# Patient Record
Sex: MALE | Race: WHITE | Employment: OTHER | URBAN - METROPOLITAN AREA
[De-identification: names, ages, dates, MRNs, and addresses within clinical notes are randomized per-mention and may not be internally consistent; named-entity substitution may affect disease eponyms.]

---

## 2022-07-26 ENCOUNTER — HOSPITAL ENCOUNTER (INPATIENT)
Age: 77
LOS: 7 days | Discharge: HOME | DRG: 100 | End: 2022-08-02
Attending: INTERNAL MEDICINE | Admitting: INTERNAL MEDICINE
Payer: MEDICARE

## 2022-07-26 PROBLEM — R56.9 SEIZURE (HCC): Status: ACTIVE | Noted: 2022-07-26

## 2022-07-26 LAB
BACTERIA: ABNORMAL /HPF
BILIRUBIN URINE: NEGATIVE
BLOOD, URINE: ABNORMAL
CLARITY: CLEAR
COLOR: YELLOW
GLUCOSE URINE: NEGATIVE MG/DL
KETONES, URINE: >=80 MG/DL
LEUKOCYTE ESTERASE, URINE: NEGATIVE
NITRITE, URINE: NEGATIVE
PH UA: 6 (ref 5–9)
PROLACTIN: 12.03 NG/ML
PROTEIN UA: 100 MG/DL
RBC UA: ABNORMAL /HPF (ref 0–2)
SPECIFIC GRAVITY UA: >=1.03 (ref 1–1.03)
UROBILINOGEN, URINE: 2 E.U./DL
WBC UA: ABNORMAL /HPF (ref 0–5)

## 2022-07-26 PROCEDURE — 2580000003 HC RX 258: Performed by: FAMILY MEDICINE

## 2022-07-26 PROCEDURE — 2060000000 HC ICU INTERMEDIATE R&B

## 2022-07-26 PROCEDURE — 84146 ASSAY OF PROLACTIN: CPT

## 2022-07-26 PROCEDURE — 6360000002 HC RX W HCPCS: Performed by: FAMILY MEDICINE

## 2022-07-26 PROCEDURE — 36415 COLL VENOUS BLD VENIPUNCTURE: CPT

## 2022-07-26 PROCEDURE — 6370000000 HC RX 637 (ALT 250 FOR IP): Performed by: FAMILY MEDICINE

## 2022-07-26 PROCEDURE — 81001 URINALYSIS AUTO W/SCOPE: CPT

## 2022-07-26 RX ORDER — DIAZEPAM 5 MG/ML
2 INJECTION, SOLUTION INTRAMUSCULAR; INTRAVENOUS EVERY 5 MIN PRN
Status: DISCONTINUED | OUTPATIENT
Start: 2022-07-26 | End: 2022-08-02 | Stop reason: HOSPADM

## 2022-07-26 RX ORDER — ONDANSETRON 2 MG/ML
4 INJECTION INTRAMUSCULAR; INTRAVENOUS EVERY 6 HOURS PRN
Status: DISCONTINUED | OUTPATIENT
Start: 2022-07-26 | End: 2022-08-02 | Stop reason: HOSPADM

## 2022-07-26 RX ORDER — LORAZEPAM 0.5 MG/1
0.5 TABLET ORAL 4 TIMES DAILY
COMMUNITY

## 2022-07-26 RX ORDER — QUETIAPINE FUMARATE 100 MG/1
50 TABLET, FILM COATED ORAL 2 TIMES DAILY
Status: ON HOLD | COMMUNITY
End: 2022-08-02 | Stop reason: HOSPADM

## 2022-07-26 RX ORDER — SODIUM CHLORIDE 9 MG/ML
INJECTION, SOLUTION INTRAVENOUS CONTINUOUS
Status: DISCONTINUED | OUTPATIENT
Start: 2022-07-26 | End: 2022-07-29

## 2022-07-26 RX ORDER — SODIUM CHLORIDE 9 MG/ML
INJECTION, SOLUTION INTRAVENOUS PRN
Status: DISCONTINUED | OUTPATIENT
Start: 2022-07-26 | End: 2022-08-02 | Stop reason: HOSPADM

## 2022-07-26 RX ORDER — LEVETIRACETAM 5 MG/ML
500 INJECTION INTRAVASCULAR EVERY 12 HOURS
Status: DISCONTINUED | OUTPATIENT
Start: 2022-07-26 | End: 2022-07-27

## 2022-07-26 RX ORDER — ENOXAPARIN SODIUM 100 MG/ML
40 INJECTION SUBCUTANEOUS DAILY
Status: DISCONTINUED | OUTPATIENT
Start: 2022-07-26 | End: 2022-08-02 | Stop reason: HOSPADM

## 2022-07-26 RX ORDER — POLYETHYLENE GLYCOL 3350 17 G/17G
17 POWDER, FOR SOLUTION ORAL DAILY PRN
Status: DISCONTINUED | OUTPATIENT
Start: 2022-07-26 | End: 2022-08-02 | Stop reason: HOSPADM

## 2022-07-26 RX ORDER — ACETAMINOPHEN 650 MG/1
650 SUPPOSITORY RECTAL EVERY 6 HOURS PRN
Status: DISCONTINUED | OUTPATIENT
Start: 2022-07-26 | End: 2022-08-02 | Stop reason: HOSPADM

## 2022-07-26 RX ORDER — LORAZEPAM 2 MG/ML
1 INJECTION INTRAMUSCULAR EVERY 5 MIN PRN
Status: DISCONTINUED | OUTPATIENT
Start: 2022-07-26 | End: 2022-07-26 | Stop reason: CLARIF

## 2022-07-26 RX ORDER — SODIUM CHLORIDE 0.9 % (FLUSH) 0.9 %
5-40 SYRINGE (ML) INJECTION PRN
Status: DISCONTINUED | OUTPATIENT
Start: 2022-07-26 | End: 2022-08-02 | Stop reason: HOSPADM

## 2022-07-26 RX ORDER — ONDANSETRON 4 MG/1
4 TABLET, ORALLY DISINTEGRATING ORAL EVERY 8 HOURS PRN
Status: DISCONTINUED | OUTPATIENT
Start: 2022-07-26 | End: 2022-08-02 | Stop reason: HOSPADM

## 2022-07-26 RX ORDER — ZOLPIDEM TARTRATE 10 MG/1
10 TABLET ORAL NIGHTLY
COMMUNITY

## 2022-07-26 RX ORDER — SODIUM CHLORIDE 0.9 % (FLUSH) 0.9 %
5-40 SYRINGE (ML) INJECTION EVERY 12 HOURS SCHEDULED
Status: DISCONTINUED | OUTPATIENT
Start: 2022-07-26 | End: 2022-08-02 | Stop reason: HOSPADM

## 2022-07-26 RX ORDER — ACETAMINOPHEN 325 MG/1
650 TABLET ORAL EVERY 6 HOURS PRN
Status: DISCONTINUED | OUTPATIENT
Start: 2022-07-26 | End: 2022-08-02 | Stop reason: HOSPADM

## 2022-07-26 RX ADMIN — SODIUM CHLORIDE: 9 INJECTION, SOLUTION INTRAVENOUS at 20:39

## 2022-07-26 RX ADMIN — ACETAMINOPHEN 650 MG: 325 TABLET ORAL at 20:38

## 2022-07-26 RX ADMIN — ONDANSETRON 4 MG: 4 TABLET, ORALLY DISINTEGRATING ORAL at 20:38

## 2022-07-26 RX ADMIN — LEVETIRACETAM 500 MG: 5 INJECTION INTRAVENOUS at 20:00

## 2022-07-26 RX ADMIN — ENOXAPARIN SODIUM 40 MG: 100 INJECTION SUBCUTANEOUS at 20:38

## 2022-07-26 ASSESSMENT — PAIN - FUNCTIONAL ASSESSMENT: PAIN_FUNCTIONAL_ASSESSMENT: 0-10

## 2022-07-26 NOTE — H&P
Hospitalist History & Physical      PCP: No primary care provider on file. Date of Service: Pt seen/examined on 7/26/2022     Chief Complaint:  had no chief complaint listed for this encounter. History Of Present Illness:    Mr. Gene Rodriguez, a 65y.o. year old male  who  has no past medical history on file. Transferred from [facility] where he presented with altered mental status. Patient has a history of end-stage dementia. Patient's family reports that he has had several episodes of what they are calling seizure-like activity. These episodes involve trembling of the arms, rolling back of the eyes, incontinence of urine. After these episodes he appears to go back to his baseline, which is altered. Family reports the main thing is after his episode the unable to get him to go to sleep even after taking Ambien. Family states he has a history of seizures but is not currently on antiepileptics. Patient was transferred to [facility] for further neurological evaluation. Vital signs reveal the patient to be mildly tachycardic with a rate of 101 and hypertensive with pressures 176/89. He is afebrile. Laboratory studies from the sending facility reveal a WBC of 15.5, glucose 131, ALT 39. Urinalysis unremarkable. Troponin 25 with repeat of 17. Chest x-ray shows cardiomegaly. CT head was unremarkable. CTA head unremarkable. CT abdomen pelvis unremarkable. Family wishes the patient be a full code      No past medical history on file. No past surgical history on file. Prior to Admission medications    Not on File         Allergies:  Patient has no allergy information on record. Social History:    TOBACCO:   has no history on file for tobacco use. ETOH:   has no history on file for alcohol use. Family History:    Reviewed in detail and negative for DM, CAD, Cancer, CVA. Positive as follows\"  No family history on file.     REVIEW OF SYSTEMS: Pertinent positives as noted in the HPI. All other systems reviewed and negative. PHYSICAL EXAM:  There were no vitals taken for this visit. General appearance: No acute distress, mildly restless/fidgety. Alert to self  HEENT: Normal cephalic, atraumatic without obvious deformity. Neck: Supple, with full range of motion. No jugular venous distention. Trachea midline. Respiratory: Clear to auscultation bilaterally  Cardiovascular: Regular rate and rhythm  Abdomen: Soft, nontender, nondistended  Musculoskeletal: No clubbing, cyanosis, edema of bilateral lower extremities. Brisk capillary refill. Skin: Normal skin color. No rashes or lesions. Neurologic: Confused. Restless, no focal deficits      CBC:   No results for input(s): WBC, RBC, HGB, HCT, MCV, RDW, PLT in the last 72 hours. BMP: No results for input(s): NA, K, CL, CO2, BUN, CREATININE, CA, MG, PHOS in the last 72 hours. LFT:  No results for input(s): PROT, ALB, ALKPHOS, ALT, AST, BILITOT, AMYLASE, LIPASE in the last 72 hours. CE:  No results for input(s): Geno Comber in the last 72 hours. PT/INR: No results for input(s): INR, APTT in the last 72 hours. BNP: No results for input(s): BNP in the last 72 hours. ESR: No results found for: SEDRATE  CRP: No results found for: CRP  D Dimer: No results found for: DDIMER   Folate and B12: No results found for: OGQEAQRR65, No results found for: FOLATE  Lactic Acid: No results found for: LACTA  Thyroid Studies: No results found for: TSH, U1MWAHM, K2UHOVP, THYROIDAB    Oupatient labs:  No results found for: CHOL, TRIG, HDL, LDLCALC, TSH, PSA, INR, LABA1C    Urinalysis:  No results found for: NITRU, WBCUA, BACTERIA, RBCUA, BLOODU, SPECGRAV, GLUCOSEU    Imaging:  No results found.     ASSESSMENT:  -Altered mental status  -Seizure-like activity  -Dementia  -Leukocytosis      PLAN:  -Admit to medicine  -Consult neurology  -Seizure precautions  -Keppra 500 mg twice daily  -Normal saline 75 mg/h  -EEG  -Continue home medications        Diet: ADULT DIET; Regular  Code Status: Full Code  Surrogate decision maker confirmed with patient:   Extended Emergency Contact Information  Primary Emergency Contact: german lizama  Mobile Phone: [phone]  Relation: Spouse    DVT Prophylaxis: []Lovenox []Heparin []PCD [] [address] []Encouraged ambulation  Disposition: []Med/Surg [] Intermediate [] ICU/CCU  Admit status: [] Observation [] Inpatient     +++++++++++++++++++++++++++++++++++++++++++++++++  Suhail Wan,   +++++++++++++++++++++++++++++++++++++++++++++++++  NOTE: This report was transcribed using voice recognition software. Every effort was made to ensure accuracy; however, inadvertent computerized transcription errors may be present.

## 2022-07-27 ENCOUNTER — APPOINTMENT (OUTPATIENT)
Dept: NEUROLOGY | Age: 77
DRG: 100 | End: 2022-07-27
Attending: INTERNAL MEDICINE
Payer: MEDICARE

## 2022-07-27 PROBLEM — F03.90 DEMENTIA (HCC): Status: ACTIVE | Noted: 2022-07-27

## 2022-07-27 PROBLEM — E87.6 HYPOKALEMIA: Status: ACTIVE | Noted: 2022-07-27

## 2022-07-27 PROBLEM — R79.89 ABNORMAL LFTS: Status: ACTIVE | Noted: 2022-07-27

## 2022-07-27 PROBLEM — R41.82 AMS (ALTERED MENTAL STATUS): Status: ACTIVE | Noted: 2022-07-27

## 2022-07-27 LAB
ALBUMIN SERPL-MCNC: 3.4 G/DL (ref 3.5–5.2)
ALP BLD-CCNC: 83 U/L (ref 40–129)
ALT SERPL-CCNC: 59 U/L (ref 0–40)
ANION GAP SERPL CALCULATED.3IONS-SCNC: 12 MMOL/L (ref 7–16)
AST SERPL-CCNC: 47 U/L (ref 0–39)
BASOPHILS ABSOLUTE: 0.04 E9/L (ref 0–0.2)
BASOPHILS RELATIVE PERCENT: 0.4 % (ref 0–2)
BILIRUB SERPL-MCNC: 1 MG/DL (ref 0–1.2)
BUN BLDV-MCNC: 8 MG/DL (ref 6–23)
CALCIUM SERPL-MCNC: 8.5 MG/DL (ref 8.6–10.2)
CHLORIDE BLD-SCNC: 106 MMOL/L (ref 98–107)
CO2: 24 MMOL/L (ref 22–29)
CREAT SERPL-MCNC: 0.7 MG/DL (ref 0.7–1.2)
EOSINOPHILS ABSOLUTE: 0 E9/L (ref 0.05–0.5)
EOSINOPHILS RELATIVE PERCENT: 0 % (ref 0–6)
GFR AFRICAN AMERICAN: >60
GFR NON-AFRICAN AMERICAN: >60 ML/MIN/1.73
GLUCOSE BLD-MCNC: 97 MG/DL (ref 74–99)
HCT VFR BLD CALC: 40.1 % (ref 37–54)
HEMOGLOBIN: 13.8 G/DL (ref 12.5–16.5)
IMMATURE GRANULOCYTES #: 0.02 E9/L
IMMATURE GRANULOCYTES %: 0.2 % (ref 0–5)
LACTIC ACID: 1.1 MMOL/L (ref 0.5–2.2)
LYMPHOCYTES ABSOLUTE: 1.48 E9/L (ref 1.5–4)
LYMPHOCYTES RELATIVE PERCENT: 13.7 % (ref 20–42)
MAGNESIUM: 2 MG/DL (ref 1.6–2.6)
MCH RBC QN AUTO: 30.9 PG (ref 26–35)
MCHC RBC AUTO-ENTMCNC: 34.4 % (ref 32–34.5)
MCV RBC AUTO: 89.9 FL (ref 80–99.9)
MONOCYTES ABSOLUTE: 0.77 E9/L (ref 0.1–0.95)
MONOCYTES RELATIVE PERCENT: 7.1 % (ref 2–12)
NEUTROPHILS ABSOLUTE: 8.48 E9/L (ref 1.8–7.3)
NEUTROPHILS RELATIVE PERCENT: 78.6 % (ref 43–80)
PDW BLD-RTO: 12.4 FL (ref 11.5–15)
PLATELET # BLD: 275 E9/L (ref 130–450)
PMV BLD AUTO: 9.9 FL (ref 7–12)
POTASSIUM REFLEX MAGNESIUM: 2.8 MMOL/L (ref 3.5–5)
RBC # BLD: 4.46 E12/L (ref 3.8–5.8)
REASON FOR REJECTION: NORMAL
REJECTED TEST: NORMAL
SODIUM BLD-SCNC: 142 MMOL/L (ref 132–146)
TOTAL PROTEIN: 6.6 G/DL (ref 6.4–8.3)
WBC # BLD: 10.8 E9/L (ref 4.5–11.5)

## 2022-07-27 PROCEDURE — 36415 COLL VENOUS BLD VENIPUNCTURE: CPT

## 2022-07-27 PROCEDURE — 6360000002 HC RX W HCPCS: Performed by: FAMILY MEDICINE

## 2022-07-27 PROCEDURE — 83605 ASSAY OF LACTIC ACID: CPT

## 2022-07-27 PROCEDURE — 80053 COMPREHEN METABOLIC PANEL: CPT

## 2022-07-27 PROCEDURE — 95816 EEG AWAKE AND DROWSY: CPT | Performed by: PSYCHIATRY & NEUROLOGY

## 2022-07-27 PROCEDURE — 97535 SELF CARE MNGMENT TRAINING: CPT

## 2022-07-27 PROCEDURE — 6370000000 HC RX 637 (ALT 250 FOR IP): Performed by: INTERNAL MEDICINE

## 2022-07-27 PROCEDURE — 2060000000 HC ICU INTERMEDIATE R&B

## 2022-07-27 PROCEDURE — 97165 OT EVAL LOW COMPLEX 30 MIN: CPT

## 2022-07-27 PROCEDURE — 6360000002 HC RX W HCPCS

## 2022-07-27 PROCEDURE — 6360000002 HC RX W HCPCS: Performed by: INTERNAL MEDICINE

## 2022-07-27 PROCEDURE — 6370000000 HC RX 637 (ALT 250 FOR IP): Performed by: PSYCHIATRY & NEUROLOGY

## 2022-07-27 PROCEDURE — 95819 EEG AWAKE AND ASLEEP: CPT

## 2022-07-27 PROCEDURE — 83735 ASSAY OF MAGNESIUM: CPT

## 2022-07-27 PROCEDURE — 85025 COMPLETE CBC W/AUTO DIFF WBC: CPT

## 2022-07-27 RX ORDER — DONEPEZIL HYDROCHLORIDE 5 MG/1
5 TABLET, FILM COATED ORAL NIGHTLY
Status: DISCONTINUED | OUTPATIENT
Start: 2022-07-27 | End: 2022-08-02 | Stop reason: HOSPADM

## 2022-07-27 RX ORDER — HALOPERIDOL 5 MG/ML
2 INJECTION INTRAMUSCULAR ONCE
Status: COMPLETED | OUTPATIENT
Start: 2022-07-27 | End: 2022-07-27

## 2022-07-27 RX ORDER — POTASSIUM CHLORIDE 7.45 MG/ML
INJECTION INTRAVENOUS
Status: COMPLETED
Start: 2022-07-27 | End: 2022-07-27

## 2022-07-27 RX ORDER — QUETIAPINE FUMARATE 25 MG/1
50 TABLET, FILM COATED ORAL NIGHTLY
Status: DISCONTINUED | OUTPATIENT
Start: 2022-07-27 | End: 2022-08-02 | Stop reason: HOSPADM

## 2022-07-27 RX ORDER — AMLODIPINE BESYLATE 5 MG/1
5 TABLET ORAL DAILY
Status: DISCONTINUED | OUTPATIENT
Start: 2022-07-27 | End: 2022-08-02 | Stop reason: HOSPADM

## 2022-07-27 RX ORDER — POTASSIUM CHLORIDE 7.45 MG/ML
10 INJECTION INTRAVENOUS
Status: COMPLETED | OUTPATIENT
Start: 2022-07-27 | End: 2022-07-27

## 2022-07-27 RX ORDER — POTASSIUM CHLORIDE 29.8 MG/ML
20 INJECTION INTRAVENOUS ONCE
Status: DISCONTINUED | OUTPATIENT
Start: 2022-07-27 | End: 2022-07-27 | Stop reason: SDUPTHER

## 2022-07-27 RX ORDER — QUETIAPINE FUMARATE 25 MG/1
25 TABLET, FILM COATED ORAL DAILY
Status: DISCONTINUED | OUTPATIENT
Start: 2022-07-28 | End: 2022-08-02 | Stop reason: HOSPADM

## 2022-07-27 RX ORDER — LEVETIRACETAM 500 MG/1
500 TABLET ORAL 2 TIMES DAILY
Status: DISCONTINUED | OUTPATIENT
Start: 2022-07-27 | End: 2022-08-02 | Stop reason: HOSPADM

## 2022-07-27 RX ADMIN — POTASSIUM CHLORIDE 10 MEQ: 7.46 INJECTION, SOLUTION INTRAVENOUS at 09:53

## 2022-07-27 RX ADMIN — QUETIAPINE FUMARATE 50 MG: 25 TABLET ORAL at 20:00

## 2022-07-27 RX ADMIN — HALOPERIDOL LACTATE 2 MG: 5 INJECTION, SOLUTION INTRAMUSCULAR at 21:52

## 2022-07-27 RX ADMIN — LEVETIRACETAM 500 MG: 5 INJECTION INTRAVENOUS at 09:36

## 2022-07-27 RX ADMIN — DONEPEZIL HYDROCHLORIDE 5 MG: 5 TABLET, FILM COATED ORAL at 20:00

## 2022-07-27 RX ADMIN — AMLODIPINE BESYLATE 5 MG: 5 TABLET ORAL at 09:40

## 2022-07-27 RX ADMIN — POTASSIUM BICARBONATE 20 MEQ: 782 TABLET, EFFERVESCENT ORAL at 20:00

## 2022-07-27 RX ADMIN — POTASSIUM CHLORIDE 10 MEQ: 7.46 INJECTION, SOLUTION INTRAVENOUS at 11:14

## 2022-07-27 RX ADMIN — LEVETIRACETAM 500 MG: 500 TABLET, FILM COATED ORAL at 20:00

## 2022-07-27 RX ADMIN — POTASSIUM BICARBONATE 20 MEQ: 782 TABLET, EFFERVESCENT ORAL at 09:40

## 2022-07-27 RX ADMIN — ENOXAPARIN SODIUM 40 MG: 100 INJECTION SUBCUTANEOUS at 07:49

## 2022-07-27 NOTE — CARE COORDINATION
Transfer from [address] [place] [address]. Patient has a history of end-stage dementia. Patient's family reports that he has had several episodes of what they are calling seizure-like activity. These episodes involve trembling of the arms, rolling back of the eyes, incontinence of urine. Neurology consult. Jailyn New. Call placed to daughter Hank Daniel. He lives with her and his wife Susana Butler. His PCP is Dr Suzie Dukes and uses [facility]. He has been independent PTA. No [facility] or [facility] history. . Wife and daughter in room - gave snf list for PA. Await pt/ot evals to assist with discharge plan. Cm/sw to follow. Electronically signed by Doris Keenan RN on 7/27/2022 at 2:03 PM

## 2022-07-27 NOTE — PROGRESS NOTES
Hospitalist Progress Note      Synopsis: Patient admitted on 7/26/2022         [facility]. Beth Noel, a 65y.o. year old male  who  has no past medical history on file. Transferred from [facility] where he presented with altered mental status. Patient has a history of end-stage dementia. Patient's family reports that he has had several episodes of what they are calling seizure-like activity. These episodes involve trembling of the arms, rolling back of the eyes, incontinence of urine. After these episodes he appears to go back to his baseline, which is altered. Family reports the main thing is after his episode the unable to get him to go to sleep even after taking Ambien. Family states he has a history of seizures but is not currently on antiepileptics. Patient was transferred to [facility] for further neurological evaluation. Vital signs reveal the patient to be mildly tachycardic with a rate of 101 and hypertensive with pressures 176/89. He is afebrile. Laboratory studies from the sending facility reveal a WBC of 15.5, glucose 131, ALT 39. Urinalysis unremarkable. Troponin 25 with repeat of 17. Chest x-ray shows cardiomegaly. CT head was unremarkable. CTA head unremarkable. CT abdomen pelvis unremarkable. ically improving. Feeling better. Stable overnight. No other overnight issues reported. Subjective   Patient seen in the EEG room. He appeared to continue to be mildly confused. All of his imaging is from [facility]    Exam:  BP (!) 168/88   Pulse 91   Temp 97.7 °F (36.5 °C) (Temporal)   Resp 17   Ht 5' 8\" (1.727 m)   Wt 162 lb (73.5 kg)   SpO2 96%   BMI 24.63 kg/m²   General appearance: No apparent distress, appears stated age and cooperative. HEENT: Pupils equal, round, and reactive to light. Conjunctivae/corneas clear. Neck:. Trachea midline.   Respiratory: Decreased cardiovascular: Regular rate and rhythm with normal S1/S2 without murmurs, rubs or gallops. Abdomen: Soft, non-tender, non-distended with normal bowel sounds. Musculoskeletal: No clubbing, cyanosis or edema bilaterally. Able to move his extremities  Is confused   skin:  No rashes    Neurologic: awake, alert and following commands but appears weak    Medications:  Reviewed    Infusion Medications    sodium chloride      sodium chloride 75 mL/hr at 07/26/22 2039     Scheduled Medications    haloperidol lactate  2 mg IntraVENous Once    potassium chloride  20 mEq IntraVENous Once    potassium bicarbonate  25 mEq Oral BID    amLODIPine  5 mg Oral Daily    sodium chloride flush  5-40 mL IntraVENous 2 times per day    enoxaparin  40 mg SubCUTAneous Daily    levETIRAcetam  500 mg IntraVENous Q12H     PRN Meds: sodium chloride flush, sodium chloride, ondansetron **OR** ondansetron, polyethylene glycol, acetaminophen **OR** acetaminophen, diazePAM    I/O    Intake/Output Summary (Last 24 hours) at 7/27/2022 0858  Last data filed at 7/27/2022 0741  Gross per 24 hour   Intake --   Output 950 ml   Net -950 ml       Labs:   Recent Labs     07/27/22  0643   WBC 10.8   HGB 13.8   HCT 40.1          Recent Labs     07/27/22  0530      K 2.8*      CO2 24   BUN 8   CREATININE 0.7   CALCIUM 8.5*       Recent Labs     07/27/22  0530   PROT 6.6   ALKPHOS 83   ALT 59*   AST 47*   BILITOT 1.0       No results for input(s): INR in the last 72 hours. No results for input(s): Jessie Gazella in the last 72 hours. Chronic labs:  No results found for: CHOL, TRIG, HDL, LDLCALC, TSH, PSA, INR, LABA1C    Microbiology:  Pending  No results for input(s): BC in the last 72 hours. No results for input(s): ORG in the last 72 hours. No results for input(s): Marinus Risk in the last 72 hours. No results for input(s): STREPNEUMAGU in the last 72 hours. No results for input(s): LP1UAG in the last 72 hours. No results for input(s): ASO in the last 72 hours.   No results for input(s): CULTRESP in the last 72 hours. No results for input(s): PROCAL in the last 72 hours. Radiology:  Imaging studies reviewed today. These are from [facility]    ASSESSMENT:    Principal Problem:    Seizure ([facility] [address]  Active Problems:    AMS (altered mental status)    Dementia ([address]    Hypokalemia    Abnormal LFTs  Resolved Problems:    * No resolved hospital problems. *       PLAN:      1. EEG in progress  2. I did review his notes from his outpatient office visit he was on lisinopril. At this point we can resume with amlodipine for now. 3.  Also he has had a Wilkins's catheter. We need to probably check for a UA given his age and lack of self-care and mobility  4. Maintain Keppra in place  5. Await neurology  He would be a great candidate for a [facility] status with comfort care status with comfort meds. We can request palliative services to see    Diet: ADULT DIET; Regular  Code Status: Full Code  PT/OT Eval Status:   Order  DVT Prophylaxis:   In place  Recommended disposition at discharge: Skilled facility    +++++++++++++++++++++++++++++++++++++++++++++++++  Sandip Mixon MD   [facility].  +++++++++++++++++++++++++++++++++++++++++++++++++  NOTE: This report was transcribed using voice recognition software. Every effort was made to ensure accuracy; however, inadvertent computerized transcription errors may be present.

## 2022-07-27 NOTE — PROCEDURES
[address] Report    MRN: [id number]   PATIENT NAME: Tatiana Jordan   DATE OF REPORT: 2022    DATE OF SERVICE: 2022    PHYSICIAN NAME: Reed Liriano DO  Referring Physician-Dr Lucina Almeida      Patient's : 1945   Patient's Age: 65 y.o. Gender: male     PROCEDURE: Routine EEG with video      Clinical Interpretation: This abnormal study showed evidence of:    Mild nonspecific cerebral dysfunction of the right frontal region  A mild nonspecific encephalopathy    Structural abnormalities should be considered for the findings above and appropriate imaging obtained if clinically indicated. No seizures or epileptiform discharges were noted during this study. ____________________________  Electronically signed by: Reed Liriano DO, 2022 9:09 AM      Patient Clinical Information   Reason for Study: Patient undergoing evaluation for seizure  Patient State: Awake  Primary neurological diagnosis: Seizure   Primary indication for monitoring: Characterization of seizure disorder    Pertinent Medications and Treatments    haloperidol     levetiracetam    diazepam    Sedatives administered: No  Intubated: No  Pharmacological paralytic: No    Reporting Period  Start of Study: 69, 2022   End of Study:  75, 2022       EEG Description  Digital video and scalp EEG monitoring was performed using the standard protocol for this laboratory. Scalp electrodes were applied in the international 10/20 system. Multiple digital montage arrangements were utilized for evaluation. EKG and video were recorded.      Background:      Occipital rhythm (posterior dominant rhythm or PDR): Present   Frequency: 8 Hz  Voltage: Medium   Organization: fair   Reactivity to eye opening/closure: fair    Drowsiness: Present - normal  Sleep: Absent    Technical and Activation Procedures:  Hyperventilation: Not done        Photic stimulation: Done - physiologic driving noted Reactivity to stimulation: Yes    Abnormalities:    I. Seizures? No    II. Rhythmic or Periodic Patterns? No    III. Other Abnormalities?         Rare irregular delta activity noted at Fp2, F4, F8 No

## 2022-07-27 NOTE — CONSULTS
Palliative Care Department  [phone]  Palliative Care Initial Consult  Provider Rody Sarabia, APRN - CNP      PATIENT: Dominga Groves  : 1945  MRN: [id number]  ADMISSION DATE: 2022  6:37 PM  Referring Provider: Ольга Echevarria MD    Palliative Medicine was consulted on hospital day 1 for assistance with Goals of care, Code Status Discussion, Family support. HPI:     Rubi Gomes is a 65 y.o. y/o male with no medical history on file, history of of end-stage dementia, seizures not on antiepileptic medication, who presented to [facility]) on 2022 as a transfer from [facility]  with altered mental status, after family complaining of patient having seizure-like activity at home. At the emergency room significant laboratory finding were WBC 15.5, ALT 39, troponin 25, and was repeated of 17. Chest x-ray shows cardiomegaly. This morning patient is having EEG at the bedside. Neurology was consulted. Palliative medicine consulted to discuss goal of care, CODE STATUS discussion, family support    ASSESSMENT/PLAN:     Pertinent Hospital Diagnoses     Seizures      Palliative Care Encounter / Counseling Regarding Goals of Care  Please see detailed goals of care discussion as below  At this time, Dominga Groves, Does Not have capacity for medical decision-making. Capacity is time limited and situation/question specific  During encounter Heather  was surrogate medical decision-maker  Outcome of goals of care meeting:  Continue with current medical treatment  Wife is going to bring in copies of POA documents  Wife would want her  to remain a full code, and have all efforts provided to maintain his life. Code status Full Code  I reviewed with the  wife Easton Braun  that given multiple medical co-morbidities, advanced disease process, and advanced age, in the event of cardiac arrest, the chances of surviving may likely be low.  It was also reviewed that if they survived a cardiac arrest, a return to prior level of function also may be low. Advanced Directives: no POA or living will in epic  Surrogate/Legal NOK:  Taylor Alvarez (Spouse) [phone]  Narda Barry (Child) [phone]    Spiritual assessment: no spiritual distress identified  Bereavement and grief: to be determined  Referrals to: none today    Thank you for the opportunity to participate in the care of Parish Singleton. RUDY Edmondson CNP  Palliative Medicine     SUBJECTIVE:     Details of Conversation:     Chart was reviewed. Saw patient at the bedside. Sitter present in the room. Patient was alert, awake, however he was pleasantly confused. Per sitter, patient was able to eat all his breakfast independently:  Spoke with patient's wife, Jayla Tilley over the phone. Introduced myself and the role of palliative medicine. Heather identified herself also as patient's POA, she is going to bring copies of POA documents today. We discussed goal of care, she stated her  was diagnosed with Alzheimer's dementia 12 years ago, and recently and they were told he was at the end stages of dementia, however Heather reports he has been self-sufficient with minimal assist at home, still having good appetite, and continent of bowels and urine onto recently, when he started experiencing seizure-like episodes over the weekend and he had episodes of incontinence. Patient was seen at [facility] after these episode over the weekend, he was then transfer to us for further management. Heather reported of noticing tremor-like symptoms back in February, he was prescribed Ativan for these episodes, he also takes Ambien at night for sleep. In the past he has used Namenda and Aricept, she believed these 2 medications were discontinue 6 years ago. And the only medication he takes at home, besides the Ativan and Ambien, is Seroquel twice daily.   Adriel Asencio stated her  would have wanted to continue to receive treatment, and once patient is stable, plan is for him to go back home to her in [facility]. We discussed CODE STATUS. Betzaida King stated her  has no spoken to her about CODE STATUS, and wanted to leave it up to her to make those decisions when times come. We did have a discussions about DNR CCA, and the probability of the patient is to arrest, he would not go back to his previous quality of life. Paola Lopez is presently standing, and is going to consider DNR CCA, however she wishes for her  to remain a full code. Comfort support was provided. Goal of care and CODE STATUS has been established. No further PM needs has been identified. Going to sign off for now. Please reconsult us if new PM needs arises. The Functional Assessment Staging Tool (FAST) can be utilized to help determine the functional assessment of a patient with Dementia. This information is helpful in determining the patients prognosis and support needed for ADL performance. Check the highest consecutive level of disability. # Level of Disability   []  1 No difficulty   [x]  2 Subjective work difficulties. [x]  3 Decreased organization capacity.    [x]  4 Decreased ability to perform complex tasks   [x]  5 Requires assistance in choosing proper clothing   [x]  6a Improperly putting on clothes without assistance or cuing   [x]  6b Difficulty adjusting bath-water temperature   [x]  6c Inability to handle the mechanics of toileting   [x]  6d Urinary Incontinence   []  6e Fecal Incontinence   []  7a Able to speak 6 or fewer intelligible words in a 24 hr period or during an interview   []  7b Able to only speak one intelligible word in a 24 hr period or during an interview   []  7c Cannot walk without assistance   []  7d Cannot sit up without assistance   []  7e Loss of ability to smile   []  7f Loss of ability to hold head up independently          Prognosis: Guarded    OBJECTIVE:     BP (!) 168/88   Pulse 91   Temp 97.7 °F (36.5 °C) (Temporal) Resp 17   Ht 5' 8\" (1.727 m)   Wt 162 lb (73.5 kg)   SpO2 96%   BMI 24.63 kg/m²     Physical Examination:  Gen: elderly, thin, NAD, awake, alert, confused  HEENT: normocephalic, atraumatic, PERRL, EOMI,   Neck: trachea midline, no JVD  Lungs: respirations easy and not labored,   Heart: regular rate and rhythm, distant heart tones,   Abdomen: normoactive bowel sounds, soft, non-tender  Extremities:edema, moving all extremities    Skin: warm, dry without rashes, lesions, bruising  Neuro: awake, confused, follows commands, no gross neurologic deficit    Objective data reviewed: labs, images, records, medication use, vitals, and chart    Time/Communication  Greater than 50% of time spent, total 50 minutes in counseling and coordination of care at the bedside regarding  CODE STATUS discussion, family support and goals of care. Thank you for allowing Palliative Medicine to participate in the care of Alex Dougherty. Note: This report was completed using computerWatchwith voiced recognition software. Every effort has been made to ensure accuracy; however, inadvertent computerized transcription errors may be present.

## 2022-07-27 NOTE — PROGRESS NOTES
[address]        Date:2022                                                  Patient Name: Gabriela Medel    MRN: [id number]    : 1945    Room: [address] [place] [address]          Evaluating OT: Elliot Casas OTR/L; [id number]       Referring Provider: Terry Vann DO    Specific Provider Orders/Date: OT Eval and Treat 22      Diagnosis: Seizure; AMS (altered mental status); Dementia; Hypokalemia; Abnormal LFTs    Surgery: None this admission     Pertinent Medical History:  has no past medical history on file.     Recommended Adaptive Equipment:      Precautions:  Fall Risk, cognition, +alarms, bedside sitter, goodman, seizure precautions     Assessment of current deficits    [x] Functional mobility  [x]ADLs  [x] Strength               []Cognition    [x] Functional transfers   [x] IADLs         [x] Safety Awareness   [x]Endurance    [] Fine Coordination              [x] Balance      [] Vision/perception   []Sensation     []Gross Motor Coordination  [] ROM  [] Delirium                   [] Motor Control     OT PLAN OF CARE   OT POC based on physician orders, patient diagnosis and results of clinical assessment    Frequency/Duration 1-3 days/wk for 2 weeks PRN   Specific OT Treatment Interventions to include:   * Instruction/training on adapted ADL techniques and AE recommendations to increase functional independence within precautions       * Training on energy conservation strategies, correct breathing pattern and techniques to improve independence/tolerance for self-care routine  * Functional transfer/mobility training/DME recommendations for increased independence, safety, and fall prevention  * Patient/Family education to increase follow through with safety techniques and functional independence  * Recommendation of environmental modifications for increased safety with functional transfers/mobility and ADLs  * Therapeutic exercise to improve motor endurance, ROM, and functional strength for ADLs/functional transfers  * Therapeutic activities to facilitate/challenge dynamic balance, stand tolerance for increased safety and independence with ADLs  * Positioning to improve skin integrity, interaction with environment and functional independence    Pt questionable historian. Home Living: Pt stating lives with mother however per chart review pt lives with wife & daughter. Bathroom setup: ?   Equipment owned: ?  Prior Level of Function: ? with ADLs , ? with IADLs; engaged in functional mobility with use of  ? Driving: ?  Occupation: ?    Pain Level: Pt with no c/o pain throughout session  Cognition: A&O: 1/4; Follows 1-2 step directions with min cuing for redirection to task - pt appears pleasantly confused at times. Memory:  Poor   Sequencing:  Poor   Problem solving:  Poor   Judgement/safety:  Poor     Functional Assessment:  AM-PAC Daily Activity Raw Score: 15/24   Initial Eval Status  Date: 7/27/22 Treatment Status  Date: STGs = LTGs  Time frame: 10-14 days   Feeding Setup  Independent    Grooming Minimal Assist   Modified [place]    UB Dressing Minimal Assist   Modified [place]    LB Dressing Moderate Assist   Modified [place]    Bathing Moderate Assist  Modified [place]    Toileting Maximal Assist   Modified [place]    Bed Mobility  Supine to sit: Minimal Assist   Sit to supine: Minimal Assist   Supine to sit: Independent   Sit to supine: Independent    Functional Transfers Sit to stand:Minimal Assist   Stand to sit:Minimal Assist  Stand pivot: NT  Commode: NT  Sit to stand: Modified [place]   Stand to sit:Modified [place]   Stand pivot: Modified [place]   Commode: Modified [place]     Functional Mobility Minimal Assist  Use of ww ~5 side steps towards HOB. Modified [place] with use of Appropriate AD.    Balance Sitting: Static - SBA     Dynamic - Minimal Assist  Standing: Minimal Assist  Sitting:     Static: Independent     Dynamic: Independent  Standing: Modified [place]    Activity Tolerance Fair  Good   Visual/  Perceptual Glasses: Yes  Appears WFL      Safety Poor  Good  during ADL completion     Hand Dominance Right   AROM (PROM) Strength Additional Info:  Goal:   RUE  WFL 4-/5 grossly tested good  and wfl FMC/dexterity noted during ADL tasks   Improve overall RUE strength FL for participation in functional tasks       LUE WFL 4-/5 grossly tested Good  and wfl FMC/dexterity noted during ADL tasks   Improve overall LUE strength WFL for participation in functional tasks       Hearing: [facility]  Sensation:  No c/o numbness or tingling BUE  Tone: WFL BUE  Edema: Unremarkable    Comment: Cleared by RN to see pt. Upon arrival patient supine in bed and agreeable to OT session. At end of session, patient supine in bed with call light and phone within reach, +bed alarm, bedside sitter, all lines and tubes intact. Overall patient demonstrated decreased independence and safety during completion of ADL/functional transfer/mobility tasks. Therapist facilitated ADL tasks, functional transfers, functional mobility, bed mobility to address safety awareness, implementation of fall prevention strategies, & engagement throughout functional tasks. Pt pleasantly confused throughout session requiring min cuing for redirection to task & command following. Pt would benefit from continued skilled OT to increase safety and independence with completion of ADL/IADL tasks for functional independence and quality of life. Treatment: OT treatment provided this date includes: ADL-  Instruction/training on safety and adapted techniques for completion of ADLs: Pt sat EOB to don/doff socks utilizing figure-4 technique. Pt required assist to don gown seated EOB.  Pt required min cuing throughout ADLs for sequencing & redirection to task as pt appears pleasantly confused at times. Mobility-  Instruction/training on safety and improved independence with bed mobility/functional transfers and functional mobility. Pt utilized ww to maintain dynamic standing balance throughout session. Pt required assist/cuing for proper hand placement & ww management/safety. Sitting EOB ~10 minutes to improve dynamic sitting balance and activity tolerance during ADLs. Skilled positioning/alignment-  Proper Positioning/Alignment. Pt required assist/cuing to maintain proper body mechanics throughout session to promote skin/joint integrity, safety awareness, & implementation of fall prevention strategies throughout daily activities. Rehab Potential: Good for established goals     LTG: maximize independence with ADLs to return to PLOF    Patient and/or family were instructed on functional diagnosis, prognosis/goals and OT plan of care. Demonstrated fair understanding. Eval Complexity: Low  History: Expanded chart review of medical records and additional review of physical, cognitive, or psychosocial history related to current functional performance  Exam: 3+ performance deficits  Assistance/Modification: Min/mod assistance or modifications required to perform tasks. May have comorbidities that affect occupational performance. Evaluation time includes thorough review of current medical information, gathering information on past medical & social history & PLOF, completion of standardized testing, informal observation of tasks, consultation with other medical professions/disciplines, assessment of data & development of POC/goals.      Time In: 12:34p  Time Out: 12:57p  Total Treatment Time: 8 minutes    Min Units   OT Eval Low 93946  x     OT Eval Medium 31737      OT Eval High 97947      OT Re-Eval [id number]       Therapeutic Ex 02433       Therapeutic Activities 42602       ADL/Self Care 26145  8 1    Orthotic Management 93083       Manual 40694     Neuro Re-Ed 64547 Non-Billable Time          Evaluation Time additionally includes thorough review of current medical information, gathering information on past medical history/social history and prior level of function, interpretation of standardized testing/informal observation of tasks, assessment of data and development of plan of care and goals.             Courtney Hernandez OTR/L; [id number]

## 2022-07-27 NOTE — CONSULTS
NEUROLOGY CONSULT NOTE       Requesting Physician: Damaris Diaz MD     Reason for Consult:  Evaluate for seizures      IMPRESSION:  Possible seizures, though history I get from his primary care doctor's notes indicates that these episodes are quite frequent, more frequent I would suspect with a seizure disorder. RECOMMENDATIONS:  I think it is reasonable to treat these as seizures and see what happens. He has been started on Keppra 500 mg twice a day and we can continue that dosage; switching to oral.  He seems to be tolerating it well. He has a sitter and if we see any seizure-like episodes that could be increased to 750 mg twice a day. If the episodes continue I would really question if these are truly epileptic seizures. Hopefully the EEG which was done this morning will be helpful. Dr. Meredith Richard will be reviewing that study. It looks as if he was on Aricept and Seroquel at home. I am going to resume those but I am ordering the Seroquel at a slightly lower dosage as it can lower seizure threshold a little. History of Present Illness:  Adrian Arias is a 65 y.o. male admitted to [address] [place] [address] on 7/26/2022. He had been lethargic in the morning of July 24 and was not talking much. He had an apparent seizure that afternoon. Went to [facility] where no neurology was available, but they had to wait until yesterday until a bed was available here for transfer. Looking back through his primary care records he has had these possible seizures for months. Episodes occur quite frequently, even perhaps a few times in a day. This involves shaking of the limbs, the eyes might roll back, and he is incontinent with the episodes. It does not look like any anticonvulsant has been tried.     Past Medical History:    Dementia, hyperlipidemia    Social History:  Social History     Tobacco Use   Smoking Status Not on file   Smokeless Tobacco Not on file     Social History     Substance and Sexual Activity   Alcohol Use None     Social History     Substance and Sexual Activity   Drug Use Not on file         Family History:   No family history on file. Review of Systems:  CONSTITUTIONAL:  feels fine  EYE:  No recent visual changes  ENT:  negative for dysphagia  RESPIRATORY:  negative for dyspnea  CARDIOVASCULAR:  negative for chest pain  GASTROINTESTINAL:  negative for abdominal pain  HEMATOLOGIC/LYMPHATIC:  negative for unusual bleeding  MUSCULOSKELETAL:  no aches or pains  BEHAVIOR/PSYCH:  on Seroquel; Hx dementia  GENITOURINARY: incontinence accompanies shaking spells  NEUROLOGIC: Denies headache or focal weakness or numbness    Allergies:    Not on File     Current Medications:   haloperidol lactate (HALDOL) injection 2 mg, Once  potassium bicarb-citric acid (EFFER-K) effervescent tablet 20 mEq, BID  amLODIPine (NORVASC) tablet 5 mg, Daily  potassium chloride 10 mEq/100 mL IVPB (Peripheral Line), Q1H  sodium chloride flush 0.9 % injection 5-40 mL, 2 times per day  sodium chloride flush 0.9 % injection 5-40 mL, PRN  0.9 % sodium chloride infusion, PRN  enoxaparin (LOVENOX) injection 40 mg, Daily  ondansetron (ZOFRAN-ODT) disintegrating tablet 4 mg, Q8H PRN   Or  ondansetron (ZOFRAN) injection 4 mg, Q6H PRN  polyethylene glycol (GLYCOLAX) packet 17 g, Daily PRN  acetaminophen (TYLENOL) tablet 650 mg, Q6H PRN   Or  acetaminophen (TYLENOL) suppository 650 mg, Q6H PRN  levETIRAcetam (KEPPRA) 500 mg/100 mL IVPB, Q12H  diazePAM (VALIUM) injection 2 mg, Q5 Min PRN  0.9 % sodium chloride infusion, Continuous       Medications Prior to Admission: QUEtiapine (SEROQUEL) 100 MG tablet, Take 50 mg by mouth in the morning and 50 mg before bedtime. LORazepam (ATIVAN) 0.5 MG tablet, Take 0.5 mg by mouth in the morning and 0.5 mg at noon and 0.5 mg in the evening and 0.5 mg before bedtime. zolpidem (AMBIEN) 10 MG tablet, Take 10 mg by mouth nightly.   It looks like he was on Aricept 5 mg nightly as well    Physical Exam:  BP (!) 168/88   Pulse 91   Temp 97.7 °F (36.5 °C) (Temporal)   Resp 17   Ht 5' 8\" (1.727 m)   Wt 162 lb (73.5 kg)   SpO2 96%   BMI 24.63 kg/m²  I Body mass index is 24.63 kg/m². I   Wt Readings from Last 1 Encounters:   07/26/22 162 lb (73.5 kg)        GENERAL: he is in no apparent distress and is well-appearing, pleasant, and cooperative  EYE:  Fundi not examined due to the Covid-19 outbreak  CARDIOVASCULAR:  Heart regular rate and rhythm. No carotid bruits detected. NEUROLOGIC:  Level of Alertness: alert  Orientation: oriented to person only  Memory and Fund of Knowledge: Very impaired. He thinks he lives in [place]. Could not think of the name of the city he lived in until he mentioned it  Attention/Concentration: normal  Language: Word finding difficulty consistent with dementia  Cranial Nerves: pupils are equal; extraocular muscles intact; facial strength and sensation are intact; hearing is intact to soft voice; the palate raises midline, and the tongue protrudes midline; shoulder shrug is symmetric  Motor Exam: Normal tone in all extremities. Strength is MRC grade 5 in all extremities bilaterally.   Sensory: Sensory symmetric to light touch  Coordination: Cerebellar function is intact for the nose-finger-nose maneuver, and for rapid alternating movements  Deep Tendon Reflexes: Reflexes are intact and bilaterally symmetric  Plantar Responses:  Downgoing  Abnormal movements: none  Station and gait: Room carotid right now and I did not try to get him up    Diagnostics:  CBC:   Lab Results   Component Value Date/Time    WBC 10.8 07/27/2022 06:43 AM    RBC 4.46 07/27/2022 06:43 AM    HGB 13.8 07/27/2022 06:43 AM    HCT 40.1 07/27/2022 06:43 AM    MCV 89.9 07/27/2022 06:43 AM    MCH 30.9 07/27/2022 06:43 AM    MCHC 34.4 07/27/2022 06:43 AM    RDW 12.4 07/27/2022 06:43 AM     07/27/2022 06:43 AM    MPV 9.9 07/27/2022 06:43 AM     CMP:    Lab Results   Component Value Date/Time     07/27/2022 05:30 AM    K 2.8 07/27/2022 05:30 AM     07/27/2022 05:30 AM    CO2 24 07/27/2022 05:30 AM    BUN 8 07/27/2022 05:30 AM    CREATININE 0.7 07/27/2022 05:30 AM    GFRAA >60 07/27/2022 05:30 AM    LABGLOM >60 07/27/2022 05:30 AM    GLUCOSE 97 07/27/2022 05:30 AM    PROT 6.6 07/27/2022 05:30 AM    LABALBU 3.4 07/27/2022 05:30 AM    CALCIUM 8.5 07/27/2022 05:30 AM    BILITOT 1.0 07/27/2022 05:30 AM    ALKPHOS 83 07/27/2022 05:30 AM    AST 47 07/27/2022 05:30 AM    ALT 59 07/27/2022 05:30 AM     White count at [facility] was 15.5  LDL cholesterol at the [facility] in June was 141    Imaging was done at [facility] which included a CT of the brain and CT angiography of the head and neck. No acute pathology. Angiography showed plaque but 50% or less stenosis. He had an MRI in March at a [facility] which showed mild atrophy and evidence of microvascular disease as well as an extra-axial mass suspected to be an arachnoid cyst.    EEG in March at a [facility] facility showed only slowing.       Electronically signed by Alejandra Lance DO on 7/27/2022 at 10:11 AM

## 2022-07-27 NOTE — PLAN OF CARE
Problem: Discharge Planning  Goal: Discharge to home or other facility with appropriate resources  Outcome: Progressing Towards Goal  Flowsheets (Taken 7/26/2022 1953 by Tex Weiss RN)  Discharge to home or other facility with appropriate resources: Refer to discharge planning if patient needs post-hospital services based on physician order or complex needs related to functional status, cognitive ability or social support system     Problem: Safety - Adult  Goal: Free from fall injury  Outcome: Progressing Towards Goal     Problem: Skin/Tissue Integrity  Goal: Absence of new skin breakdown  Description: 1. Monitor for areas of redness and/or skin breakdown  2. Assess vascular access sites hourly  3. Every 4-6 hours minimum:  Change oxygen saturation probe site  4. Every 4-6 hours:  If on nasal continuous positive airway pressure, respiratory therapy assess nares and determine need for appliance change or resting period. Outcome: Progressing Towards Goal     Problem: Confusion  Goal: Confusion, delirium, dementia, or psychosis is improved or at baseline  Description: INTERVENTIONS:  1. Assess for possible contributors to thought disturbance, including medications, impaired vision or hearing, underlying metabolic abnormalities, dehydration, psychiatric diagnoses, and notify attending LIP  2. [place] high risk fall precautions, as indicated  3. Provide frequent short contacts to provide reality reorientation, refocusing and direction  4. Decrease environmental stimuli, including noise as appropriate  5. Monitor and intervene to maintain adequate nutrition, hydration, elimination, sleep and activity  6. If unable to ensure safety without constant attention obtain sitter and review sitter guidelines with assigned personnel  7.  Initiate Psychosocial CNS and Spiritual Care consult, as indicated  Outcome: Progressing Towards Goal     Problem: Pain  Goal: Verbalizes/displays adequate comfort level or baseline comfort level  Outcome: Progressing Towards Goal

## 2022-07-28 LAB
ANION GAP SERPL CALCULATED.3IONS-SCNC: 13 MMOL/L (ref 7–16)
BUN BLDV-MCNC: 11 MG/DL (ref 6–23)
CALCIUM SERPL-MCNC: 8.8 MG/DL (ref 8.6–10.2)
CHLORIDE BLD-SCNC: 105 MMOL/L (ref 98–107)
CO2: 27 MMOL/L (ref 22–29)
CREAT SERPL-MCNC: 1 MG/DL (ref 0.7–1.2)
GFR AFRICAN AMERICAN: >60
GFR NON-AFRICAN AMERICAN: >60 ML/MIN/1.73
GLUCOSE BLD-MCNC: 93 MG/DL (ref 74–99)
POTASSIUM REFLEX MAGNESIUM: 3.6 MMOL/L (ref 3.5–5)
SODIUM BLD-SCNC: 145 MMOL/L (ref 132–146)

## 2022-07-28 PROCEDURE — 97530 THERAPEUTIC ACTIVITIES: CPT

## 2022-07-28 PROCEDURE — 2060000000 HC ICU INTERMEDIATE R&B

## 2022-07-28 PROCEDURE — 99232 SBSQ HOSP IP/OBS MODERATE 35: CPT | Performed by: PHYSICIAN ASSISTANT

## 2022-07-28 PROCEDURE — 2580000003 HC RX 258: Performed by: FAMILY MEDICINE

## 2022-07-28 PROCEDURE — 6370000000 HC RX 637 (ALT 250 FOR IP): Performed by: PSYCHIATRY & NEUROLOGY

## 2022-07-28 PROCEDURE — 6370000000 HC RX 637 (ALT 250 FOR IP): Performed by: INTERNAL MEDICINE

## 2022-07-28 PROCEDURE — 80048 BASIC METABOLIC PNL TOTAL CA: CPT

## 2022-07-28 PROCEDURE — 97161 PT EVAL LOW COMPLEX 20 MIN: CPT

## 2022-07-28 PROCEDURE — 36415 COLL VENOUS BLD VENIPUNCTURE: CPT

## 2022-07-28 PROCEDURE — 6360000002 HC RX W HCPCS: Performed by: FAMILY MEDICINE

## 2022-07-28 RX ADMIN — LEVETIRACETAM 500 MG: 500 TABLET, FILM COATED ORAL at 08:44

## 2022-07-28 RX ADMIN — SODIUM CHLORIDE, PRESERVATIVE FREE 10 ML: 5 INJECTION INTRAVENOUS at 08:45

## 2022-07-28 RX ADMIN — QUETIAPINE FUMARATE 50 MG: 25 TABLET ORAL at 19:32

## 2022-07-28 RX ADMIN — LEVETIRACETAM 500 MG: 500 TABLET, FILM COATED ORAL at 19:32

## 2022-07-28 RX ADMIN — POTASSIUM BICARBONATE 20 MEQ: 782 TABLET, EFFERVESCENT ORAL at 19:32

## 2022-07-28 RX ADMIN — AMLODIPINE BESYLATE 5 MG: 5 TABLET ORAL at 08:44

## 2022-07-28 RX ADMIN — POTASSIUM BICARBONATE 20 MEQ: 782 TABLET, EFFERVESCENT ORAL at 08:44

## 2022-07-28 RX ADMIN — ENOXAPARIN SODIUM 40 MG: 100 INJECTION SUBCUTANEOUS at 08:44

## 2022-07-28 RX ADMIN — QUETIAPINE FUMARATE 25 MG: 25 TABLET ORAL at 08:44

## 2022-07-28 RX ADMIN — DONEPEZIL HYDROCHLORIDE 5 MG: 5 TABLET, FILM COATED ORAL at 19:33

## 2022-07-28 NOTE — PROGRESS NOTES
Physician Progress Note      PATIENTRajace Parker  CSN #:                  [id number]  :                       1945  ADMIT DATE:       2022 6:37 PM  DISCH DATE:  RESPONDING  PROVIDER #:        ROBEL STERLING          QUERY TEXT:    Pt admitted with AMS and has encephalopathy documented. If possible, please   document in progress notes and discharge summary further specificity regarding   the type of encephalopathy:    The medical record reflects the following:  Risk Factors: possible seizure activity, hx of alzheimers  Clinical Indicators: Pt has baseline altered mental status 2/2 dementia per   H&P. Pt has had reported episodes of seizure like activity per family. Pt   unable to make decisions per documentation, believed to have Alzheimer's   Dementia, per neuro. EEG done and interpretation was: Mild nonspecific   cerebral dysfunction of the right frontal region. A mild nonspecific   encephalopathy. Treatment: EEG, neuro consult, imaging, labs    Thank you,  Reilly Esposito RN  Options provided:  -- Metabolic encephalopathy  -- Drug-induced encephalopathy due to, Please specify substance. -- Drug-induced encephalopathy, substance(s) unknown  -- Other - I will add my own diagnosis  -- Disagree - Not applicable / Not valid  -- Disagree - Clinically unable to determine / Unknown  -- Refer to Clinical Documentation Reviewer    PROVIDER RESPONSE TEXT:    This patient has metabolic encephalopathy.     Query created by: Dawna Whitlock on 2022 12:44 PM      Electronically signed by:  Kendra Yin 2022 4:35 PM

## 2022-07-28 NOTE — PROGRESS NOTES
Physical Therapy  Physical Therapy Initial Assessment     Name: Maci Benites  : 1945  MRN: [id number]      Date of Service: 2022    Evaluating PT:  Danny Pinon, PT, DPT [id number]    Room #:  6601/4023-L  Diagnosis:  Seizure ([address] [R56.9]  PMHx/PSHx:  History reviewed. No pertinent past medical history. Precautions:  Fall risk, Alarms, Cognition  Equipment Needs:  FWW    SUBJECTIVE:    Pt lives with wife at home. Social history limited due to cognition. OBJECTIVE:   Initial Evaluation  Date: 22 Treatment Short Term/ Long Term   Goals   AM-PAC 6 Clicks      Was pt agreeable to Eval/treatment? Yes     Does pt have pain? No     Bed Mobility  Rolling: NT  Supine to sit: SBA  Sit to supine: SBA  Scooting: SBA  Rolling: Supervision  Supine to sit: Supervision  Sit to supine: Supervision  Scooting: Supervision   Transfers Sit to stand: Mod A  Stand to sit: Mod A  Stand pivot: Mod A with HHA  Sit to stand: Supervision  Stand to sit: Supervision  Stand pivot: Supervision with AAD   Ambulation    2 feet side stepping with HHA Mod A  >50 feet with AAD Mod I   Stair negotiation: ascended and descended NT  2 steps with bilateral rail Supervision   ROM BUE:  Refer to OT   BLE:  WFL     Strength BUE:  Refer to OT  BLE:  limited due to inability to follow commands  4/5  4+/5   Balance Sitting EOB:  SBA  Dynamic Standing:   Mod A with HHA  Sitting EOB:  Supervision  Dynamic Standing:  Supervision AAD     Pt is A & O x 1 (self)  Sensation:  Pt denies numbness and tingling to extremities  Edema:  none    Vitals: WFLs      Patient education  Pt educated on role and benefits of physical therapy, safety and technique for mobility    Patient response to education:   Pt verbalized understanding Pt demonstrated skill Pt requires further education in this area   No No yes     ASSESSMENT:    Conditions Requiring Skilled Therapeutic Intervention:    [x]Decreased strength     []Decreased ROM  [x]Decreased functional mobility  [x]Decreased balance   [x]Decreased endurance   []Decreased posture  []Decreased sensation  []Decreased coordination   []Decreased vision  [x]Decreased safety awareness   []Increased pain       Comments:  Patient deemed medically stable prior to therapy evaluation. Patient was supine in bed finishing lunch upon therapy arrival with sitter present. Patient provided consent to evaluation. His family arrived at start of mobility assessment. Due to cognition patient had difficulty following multi-step directions and demonstrated fearful behaviors with sit to stand transfers. AD not utilized due confusion increasing physical assist from therapist. Patient continued holding onto bed rails with side stepping despite cuing. Increased time to complete trials of side stepping due to fear. Patient left supine with HOB elevated with family present, with all needs met and call light in reach for safety. Treatment:  Patient practiced and was instructed in the following treatment:    Bed mobility training - pt given verbal and tactile cues to facilitate proper sequencing and safety during rolling and supine>sit as well as provided with physical assistance to complete task   Sitting EOB for >5 minutes for upright tolerance, postural awareness and BLE ROM  Transfer training - pt was given verbal and tactile cues to facilitate proper hand placement, technique and safety during sit to stand and stand to sit as well as provided with physical assistance. STS x3 due to mild bout of bowel incontinence  Gait training- pt was given verbal and tactile cues to facilitate use of HHA during ambulation as well as provided with physical assistance. Pt's/ family goals   1. Return to PLOF    Prognosis is fair for reaching above PT goals. Patient and or family understand(s) diagnosis, prognosis, and plan of care.   Yes    PHYSICAL THERAPY PLAN OF CARE:    PT POC is established based on physician order and patient diagnosis

## 2022-07-28 NOTE — PROGRESS NOTES
Hospitalist Progress Note      SYNOPSIS: Patient admitted on 2022 for Seizure [facility])      SUBJECTIVE:    Patient seen and examined  Records reviewed. ClinMr. Memory Rm, a 65y.o. year old male  who  has no past medical history on file. Transferred from [facility] where he presented with altered mental status. Patient has a history of end-stage dementia. Patient's family reports that he has had several episodes of what they are calling seizure-like activity. These episodes involve trembling of the arms, rolling back of the eyes, incontinence of urine. After these episodes he appears to go back to his baseline, which is altered. Family reports the main thing is after his episode the unable to get him to go to sleep even after taking Ambien. Family states he has a history of seizures but is not currently on antiepileptics. Patient was transferred to [facility] for further neurological evaluation. CT head was unremarkable. CTA head unremarkable. CT abdomen pelvis unremarkable and he was seen by neurology and EEG was performed. He was started on Keppra 500 mg twice daily with seizure precautions. Given his advanced dementia palliative care was also consulted family wants to continue as full code and continue treatment plan. Stable overnight. No other overnight issues reported. Temp (24hrs), Av.6 °F (36.4 °C), Min:96.9 °F (36.1 °C), Max:98 °F (36.7 °C)    DIET: ADULT DIET; Regular  CODE: Full Code    Intake/Output Summary (Last 24 hours) at 2022 1525  Last data filed at 2022 1418  Gross per 24 hour   Intake --   Output 2900 ml   Net -2900 ml       OBJECTIVE:    BP (!) 164/81   Pulse 81   Temp 97.8 °F (36.6 °C)   Resp 16   Ht 5' 8\" (1.727 m)   Wt 162 lb (73.5 kg)   SpO2 96%   BMI 24.63 kg/m²     General appearance: No apparent distress, appears stated age and cooperative. HEENT:  Conjunctivae/corneas clear. Neck: Supple.  No jugular venous distention. Respiratory: Clear to auscultation bilaterally, normal respiratory effort  Cardiovascular: Regular rate rhythm, normal S1-S2  Abdomen: Soft, nontender, nondistended  Musculoskeletal: No clubbing, cyanosis, no bilateral lower extremity edema. Brisk capillary refill. Skin:  No rashes  on visible skin  Neurologic: awake, alert and following commands      Assessment  1. New onset seizure  2. Hypokalemia  3. Dementia       Plan  1. Continue Keppra for now. At this time awaiting discharge to SNF as family is yet to make a choice as  has not been able to find any network in the patient's insurance plan. DISPOSITION:     Medications:  REVIEWED DAILY    Infusion Medications    sodium chloride      sodium chloride 75 mL/hr at 07/26/22 2039     Scheduled Medications    potassium bicarb-citric acid  20 mEq Oral BID    amLODIPine  5 mg Oral Daily    levETIRAcetam  500 mg Oral BID    donepezil  5 mg Oral Nightly    QUEtiapine  25 mg Oral Daily    QUEtiapine  50 mg Oral Nightly    sodium chloride flush  5-40 mL IntraVENous 2 times per day    enoxaparin  40 mg SubCUTAneous Daily     PRN Meds: sodium chloride flush, sodium chloride, ondansetron **OR** ondansetron, polyethylene glycol, acetaminophen **OR** acetaminophen, diazePAM    Labs:     Recent Labs     07/27/22  0643   WBC 10.8   HGB 13.8   HCT 40.1          Recent Labs     07/27/22  0530      K 2.8*      CO2 24   BUN 8   CREATININE 0.7   CALCIUM 8.5*       Recent Labs     07/27/22  0530   PROT 6.6   ALKPHOS 83   ALT 59*   AST 47*   BILITOT 1.0       No results for input(s): INR in the last 72 hours. No results for input(s): Curlie Lat in the last 72 hours.     Chronic labs:    No results found for: CHOL, TRIG, HDL, LDLCALC, TSH, PSA, INR, LABA1C    Radiology: REVIEWED DAILY    +++++++++++++++++++++++++++++++++++++++++++++++++  Rebbecca Cogan, MD  [facility] - [address] [facility] [place]  +++++++++++++++++++++++++++++++++++++++++++++++++  NOTE: This report was transcribed using voice recognition software. Every effort was made to ensure accuracy; however, inadvertent computerized transcription errors may be present.

## 2022-07-28 NOTE — CARE COORDINATION
Transfer from [place] for  [facility]. Pt mao noted 12/24. Spoke with family for snf choices- [facility] - no bed, 2. [facility] - not in network with insurance. Await further choices. Electronically signed by Johanny Rees RN on 7/28/2022 at 3:31 PM    3. Choice- referral to [facility] for [facility] - await call back. Electronically signed by Johanny Rees RN on 7/28/2022 at 3:43 PM    Call back from [facility]- they do have beds @ [place] and they have wonder guard there for patients. Wife updated with above. Electronically signed by Johanny Rees RN on 7/28/2022 at 4:01 PM

## 2022-07-28 NOTE — PROGRESS NOTES
[address]  Neurology Follow up     Date:  7/28/2022  Patient Name:  Verónica Arcos  YOB: 1945  MRN: [id number]     Assessment  Possible seizure activity, though information is limited and patient is a poor historian, with his dementia though, he is at an elevated risk for seizure development. Suspected Alzheimer's dementia at baseline     Plan  Continue Keppra 500 mg BID  Seizure safety precautions - no driving   Follow up outpatient for further monitoring of spells and monitoring   Okay to d/c from neuro- please call with questions  Neuro will sign off       Subjective:     Denies any further shaking spells. EEG showed a mild nonspecific cerebral dysfunction in the R frontal region and a mild nonspecific encephalopathy     Denies weakness, numbness, seizure activity. ROS limited     Allergies:      Not on File     Physical Examination  Vitals   Vitals:    07/27/22 0751 07/27/22 2030 07/27/22 2345 07/28/22 0845   BP:  (!) 170/91 (!) 179/101 (!) 164/81   Pulse: 91 88 90 81   Resp:  15 15 16   Temp:  96.9 °F (36.1 °C) 98 °F (36.7 °C) 97.8 °F (36.6 °C)   TempSrc:  Temporal Temporal    SpO2:  94% 95% 96%   Weight:       Height:            General: Patient appears in no acute distress. Awake and oriented x 1  HEENT: Normocephalic, atraumatic  Chest: effort normal  Extremities/Peripheral vascular: No edema/swelling noted. No cold limbs noted. Neurologic Examination    Mental Status  Alert, and oriented to person. Speech is fluent with intact comprehension. Memories impaired. Attention and concentration appeared normal.     Cranial Nerves  II. Visual fields full to confrontation bilaterally. III, IV, VI: Pupils equally round and reactive to light, 3 to 2 mm bilaterally. EOMs: full, no nystagmus. V. Facial sensation intact to light touch bilaterally  VII: Facial movements symmetric and strong  VIII: Hearing intact to voice  IX,X: Palate elevates symmetrically.  No dysarthria  XI: Sternocleidomastoid and trapezius 5/5 bilaterally   XII: Tongue is midline    Motor     Right Left   Right Left   Deltoid 5 5  Hip Flexion 5 5   Biceps      5  5  Knee Extension 5 5   Triceps 5 5  Knee Flexion 5 5   Handgrip 5 5  Ankle Dorsiflexion 5 5       Ankle Plantarflexion 5 5     Tone: Increased tone in legs     Bulk: Normal in all four limbs with no evidence of atrophy    Pronator drift: absent bilaterally    Sensation  Light Touch: Intact distally in all four limbs    Reflexes    No Mccauley's     B/l palmar grasps     Coordination  FFM symmetrical   No resting tremors observed     Gait  Deferred for safety/fall consideration      Labs  Recent Labs     07/27/22  0530 07/27/22  0643     --    K 2.8*  --      --    CO2 24  --    BUN 8  --    CREATININE 0.7  --    GLUCOSE 97  --    CALCIUM 8.5*  --    PROT 6.6  --    LABALBU 3.4*  --    BILITOT 1.0  --    ALKPHOS 83  --    AST 47*  --    ALT 59*  --    WBC  --  10.8   RBC  --  4.46   HGB  --  13.8   HCT  --  40.1   MCV  --  89.9   MCH  --  30.9   MCHC  --  34.4   RDW  --  12.4   PLT  --  275   MPV  --  9.9   LACTA 1.1  --        Imaging    Imaging was done at [facility] which included a CT of the brain and CT angiography of the head and neck. No acute pathology. Angiography showed plaque but 50% or less stenosis. (No images to review- just report)      He had an [facility] in March [facility] which showed mild atrophy and evidence of microvascular disease as well as an extra-axial mass suspected to be an arachnoid cyst. (No images to review, just report)     I have personally reviewed all pertinent neuro labs and images     Other Testing Results  EEG   This abnormal study showed evidence of:     Mild nonspecific cerebral dysfunction of the right frontal region  A mild nonspecific encephalopathy     Structural abnormalities should be considered for the findings above and appropriate imaging obtained if clinically indicated.  No seizures or epileptiform discharges were noted during this study.       Electronically signed by KIMANI Lynn on 7/28/2022 at 12:02 PM

## 2022-07-28 NOTE — PLAN OF CARE
Problem: Discharge Planning  Goal: Discharge to home or other facility with appropriate resources  Outcome: Progressing     Problem: Safety - Adult  Goal: Free from fall injury  Outcome: Progressing     Problem: Skin/Tissue Integrity  Goal: Absence of new skin breakdown  Description: 1. Monitor for areas of redness and/or skin breakdown  2. Assess vascular access sites hourly  3. Every 4-6 hours minimum:  Change oxygen saturation probe site  4. Every 4-6 hours:  If on nasal continuous positive airway pressure, respiratory therapy assess nares and determine need for appliance change or resting period. Outcome: Progressing     Problem: Confusion  Goal: Confusion, delirium, dementia, or psychosis is improved or at baseline  Description: INTERVENTIONS:  1. Assess for possible contributors to thought disturbance, including medications, impaired vision or hearing, underlying metabolic abnormalities, dehydration, psychiatric diagnoses, and notify attending LIP  2. [place] high risk fall precautions, as indicated  3. Provide frequent short contacts to provide reality reorientation, refocusing and direction  4. Decrease environmental stimuli, including noise as appropriate  5. Monitor and intervene to maintain adequate nutrition, hydration, elimination, sleep and activity  6. If unable to ensure safety without constant attention obtain sitter and review sitter guidelines with assigned personnel  7.  Initiate Psychosocial CNS and Spiritual Care consult, as indicated  Outcome: Progressing     Problem: Pain  Goal: Verbalizes/displays adequate comfort level or baseline comfort level  Outcome: Progressing

## 2022-07-29 LAB
ANION GAP SERPL CALCULATED.3IONS-SCNC: 9 MMOL/L (ref 7–16)
BUN BLDV-MCNC: 12 MG/DL (ref 6–23)
CALCIUM SERPL-MCNC: 8.5 MG/DL (ref 8.6–10.2)
CHLORIDE BLD-SCNC: 105 MMOL/L (ref 98–107)
CO2: 25 MMOL/L (ref 22–29)
CREAT SERPL-MCNC: 0.8 MG/DL (ref 0.7–1.2)
GFR AFRICAN AMERICAN: >60
GFR NON-AFRICAN AMERICAN: >60 ML/MIN/1.73
GLUCOSE BLD-MCNC: 97 MG/DL (ref 74–99)
MAGNESIUM: 1.9 MG/DL (ref 1.6–2.6)
POTASSIUM REFLEX MAGNESIUM: 3.2 MMOL/L (ref 3.5–5)
SODIUM BLD-SCNC: 139 MMOL/L (ref 132–146)

## 2022-07-29 PROCEDURE — 2060000000 HC ICU INTERMEDIATE R&B

## 2022-07-29 PROCEDURE — 2580000003 HC RX 258: Performed by: FAMILY MEDICINE

## 2022-07-29 PROCEDURE — 6370000000 HC RX 637 (ALT 250 FOR IP): Performed by: INTERNAL MEDICINE

## 2022-07-29 PROCEDURE — 80048 BASIC METABOLIC PNL TOTAL CA: CPT

## 2022-07-29 PROCEDURE — 6370000000 HC RX 637 (ALT 250 FOR IP): Performed by: PSYCHIATRY & NEUROLOGY

## 2022-07-29 PROCEDURE — 6360000002 HC RX W HCPCS: Performed by: FAMILY MEDICINE

## 2022-07-29 PROCEDURE — 83735 ASSAY OF MAGNESIUM: CPT

## 2022-07-29 PROCEDURE — 36415 COLL VENOUS BLD VENIPUNCTURE: CPT

## 2022-07-29 RX ADMIN — POTASSIUM BICARBONATE 40 MEQ: 782 TABLET, EFFERVESCENT ORAL at 08:11

## 2022-07-29 RX ADMIN — LEVETIRACETAM 500 MG: 500 TABLET, FILM COATED ORAL at 08:11

## 2022-07-29 RX ADMIN — ENOXAPARIN SODIUM 40 MG: 100 INJECTION SUBCUTANEOUS at 08:10

## 2022-07-29 RX ADMIN — SODIUM CHLORIDE, PRESERVATIVE FREE 10 ML: 5 INJECTION INTRAVENOUS at 08:11

## 2022-07-29 RX ADMIN — QUETIAPINE FUMARATE 25 MG: 25 TABLET ORAL at 08:11

## 2022-07-29 RX ADMIN — POTASSIUM BICARBONATE 20 MEQ: 782 TABLET, EFFERVESCENT ORAL at 08:14

## 2022-07-29 RX ADMIN — AMLODIPINE BESYLATE 5 MG: 5 TABLET ORAL at 08:11

## 2022-07-29 RX ADMIN — QUETIAPINE FUMARATE 50 MG: 25 TABLET ORAL at 19:49

## 2022-07-29 RX ADMIN — LEVETIRACETAM 500 MG: 500 TABLET, FILM COATED ORAL at 19:49

## 2022-07-29 RX ADMIN — POTASSIUM BICARBONATE 20 MEQ: 782 TABLET, EFFERVESCENT ORAL at 19:49

## 2022-07-29 RX ADMIN — DONEPEZIL HYDROCHLORIDE 5 MG: 5 TABLET, FILM COATED ORAL at 19:49

## 2022-07-29 NOTE — CARE COORDINATION
Return message from Memo @ [facility]- has been accepted and will start precert. [facility]. Call placed to juliano Jacobo and discussed above. Asking for referral to [facility]- Call placed Ruslan Cronin left. cm/sw to follow. Electronically signed by Thomas Bravo RN on 7/29/2022 at 12:38 PM    Referral to [facility] for [place] await determination. Electronically signed by Thomas Bravo RN on 7/29/2022 at 1:26 PM    Call back from [facility]- she has spoken with juliano Jacobo and the plan will be [employer] and then [facility] when they have a a bed. She is starting precert. .Will need Pass[employer]. Envelope and ambuance form. cm/sw to follow. Electronically signed by Thomas Bravo RN on 7/29/2022 at 3:16 PM    Placed on weekend therapy evals for precert. .Electronically signed by Thomas Bravo RN on 7/29/2022 at 3:33 PM

## 2022-07-29 NOTE — PLAN OF CARE
Problem: Discharge Planning  Goal: Discharge to home or other facility with appropriate resources  7/29/2022 0603 by Be English RN  Outcome: Progressing  7/28/2022 1710 by Maureen Peña RN  Outcome: Progressing     Problem: Safety - Adult  Goal: Free from fall injury  7/29/2022 0603 by Be English RN  Outcome: Progressing  7/28/2022 1710 by Maureen Peña RN  Outcome: Progressing     Problem: Skin/Tissue Integrity  Goal: Absence of new skin breakdown  Description: 1. Monitor for areas of redness and/or skin breakdown  2. Assess vascular access sites hourly  3. Every 4-6 hours minimum:  Change oxygen saturation probe site  4. Every 4-6 hours:  If on nasal continuous positive airway pressure, respiratory therapy assess nares and determine need for appliance change or resting period. 7/29/2022 0603 by Be English RN  Outcome: Progressing  7/28/2022 1710 by Maureen Peña RN  Outcome: Progressing     Problem: Confusion  Goal: Confusion, delirium, dementia, or psychosis is improved or at baseline  Description: INTERVENTIONS:  1. Assess for possible contributors to thought disturbance, including medications, impaired vision or hearing, underlying metabolic abnormalities, dehydration, psychiatric diagnoses, and notify attending LIP  2. [place] high risk fall precautions, as indicated  3. Provide frequent short contacts to provide reality reorientation, refocusing and direction  4. Decrease environmental stimuli, including noise as appropriate  5. Monitor and intervene to maintain adequate nutrition, hydration, elimination, sleep and activity  6. If unable to ensure safety without constant attention obtain sitter and review sitter guidelines with assigned personnel  7.  Initiate Psychosocial CNS and Spiritual Care consult, as indicated  7/29/2022 0603 by Be English RN  Outcome: Progressing  7/28/2022 1710 by Maureen Peña RN  Outcome: Progressing     Problem: Pain  Goal: Verbalizes/displays adequate comfort level or baseline comfort level  7/29/2022 0603 by Ronal Guerrier, RN  Outcome: Progressing  7/28/2022 1710 by Soy Cole RN  Outcome: Progressing

## 2022-07-29 NOTE — PROGRESS NOTES
Hospitalist Progress Note      SYNOPSIS: Patient admitted on 2022 for Seizure [facility])      SUBJECTIVE:    Denies new issues  No further seizures since admission  Patient is confused with sitter by the bedside    ClinMr. Thomas Hou, a 65y.o. year old male  who  has no past medical history on file. Transferred from [facility] where he presented with altered mental status. Patient has a history of end-stage dementia. Patient's family reports that he has had several episodes of what they are calling seizure-like activity. These episodes involve trembling of the arms, rolling back of the eyes, incontinence of urine. After these episodes he appears to go back to his baseline, which is altered. Family reports the main thing is after his episode the unable to get him to go to sleep even after taking Ambien. Family states he has a history of seizures but is not currently on antiepileptics. Patient was transferred to [facility] for further neurological evaluation. CT head was unremarkable. CTA head unremarkable. CT abdomen pelvis unremarkable and he was seen by neurology and EEG was performed. He was started on Keppra 500 mg twice daily with seizure precautions. Given his advanced dementia palliative care was also consulted family wants to continue as full code and continue treatment plan. Stable overnight. No other overnight issues reported. Temp (24hrs), Av.6 °F (36.4 °C), Min:97.1 °F (36.2 °C), Max:98 °F (36.7 °C)    DIET: ADULT DIET;  Regular  CODE: Full Code    Intake/Output Summary (Last 24 hours) at 2022 1320  Last data filed at 2022 0858  Gross per 24 hour   Intake --   Output 5900 ml   Net -5900 ml       OBJECTIVE:    BP (!) 154/95   Pulse 86   Temp 97.7 °F (36.5 °C) (Temporal)   Resp 16   Ht 5' 8\" (1.727 m)   Wt 162 lb (73.5 kg)   SpO2 94%   BMI 24.63 kg/m²     General appearance: No apparent distress, appears stated age and cooperative. HEENT:  Conjunctivae/corneas clear. Neck: Supple. No jugular venous distention. Respiratory: Clear to auscultation bilaterally, normal respiratory effort  Cardiovascular: Regular rate rhythm, normal S1-S2  Abdomen: Soft, nontender, nondistended  Musculoskeletal: No clubbing, cyanosis, no bilateral lower extremity edema. Brisk capillary refill. Skin:  No rashes  on visible skin  Neurologic: awake, alert and following commands      Assessment  1. New onset seizure  2. Hypokalemia  3. Dementia       Plan  Continue [address]  Neurology input noted  Replace potassium  Discontinue IV fluids  Subacute rehab discharge awaited          DISPOSITION: Likely subacute rehab on discharge    Medications:  REVIEWED DAILY    Infusion Medications    sodium chloride       Scheduled Medications    potassium bicarb-citric acid  20 mEq Oral BID    amLODIPine  5 mg Oral Daily    levETIRAcetam  500 mg Oral BID    donepezil  5 mg Oral Nightly    QUEtiapine  25 mg Oral Daily    QUEtiapine  50 mg Oral Nightly    sodium chloride flush  5-40 mL IntraVENous 2 times per day    enoxaparin  40 mg SubCUTAneous Daily     PRN Meds: sodium chloride flush, sodium chloride, ondansetron **OR** ondansetron, polyethylene glycol, acetaminophen **OR** acetaminophen, diazePAM    Labs:     Recent Labs     07/27/22  0643   WBC 10.8   HGB 13.8   HCT 40.1          Recent Labs     07/27/22  0530 07/28/22  1732 07/29/22  0515    145 139   K 2.8* 3.6 3.2*    105 105   CO2 24 27 25   BUN 8 11 12   CREATININE 0.7 1.0 0.8   CALCIUM 8.5* 8.8 8.5*       Recent Labs     07/27/22  0530   PROT 6.6   ALKPHOS 83   ALT 59*   AST 47*   BILITOT 1.0       No results for input(s): INR in the last 72 hours. No results for input(s): Kelin Rivera in the last 72 hours.     Chronic labs:    No results found for: CHOL, TRIG, HDL, LDLCALC, TSH, PSA, INR, LABA1C    Radiology: REVIEWED DAILY    +++++++++++++++++++++++++++++++++++++++++++++++++  Roberto Carlos Finch MD  [address]  +++++++++++++++++++++++++++++++++++++++++++++++++  NOTE: This report was transcribed using voice recognition software. Every effort was made to ensure accuracy; however, inadvertent computerized transcription errors may be present.

## 2022-07-30 LAB — METER GLUCOSE: 107 MG/DL (ref 74–99)

## 2022-07-30 PROCEDURE — 6370000000 HC RX 637 (ALT 250 FOR IP): Performed by: INTERNAL MEDICINE

## 2022-07-30 PROCEDURE — 6360000002 HC RX W HCPCS: Performed by: FAMILY MEDICINE

## 2022-07-30 PROCEDURE — 6370000000 HC RX 637 (ALT 250 FOR IP): Performed by: PSYCHIATRY & NEUROLOGY

## 2022-07-30 PROCEDURE — 2060000000 HC ICU INTERMEDIATE R&B

## 2022-07-30 PROCEDURE — 82962 GLUCOSE BLOOD TEST: CPT

## 2022-07-30 RX ORDER — ATORVASTATIN CALCIUM 10 MG/1
10 TABLET, FILM COATED ORAL NIGHTLY
Status: DISCONTINUED | OUTPATIENT
Start: 2022-07-30 | End: 2022-08-02 | Stop reason: HOSPADM

## 2022-07-30 RX ADMIN — LEVETIRACETAM 500 MG: 500 TABLET, FILM COATED ORAL at 09:51

## 2022-07-30 RX ADMIN — LEVETIRACETAM 500 MG: 500 TABLET, FILM COATED ORAL at 20:43

## 2022-07-30 RX ADMIN — POTASSIUM BICARBONATE 20 MEQ: 782 TABLET, EFFERVESCENT ORAL at 09:51

## 2022-07-30 RX ADMIN — QUETIAPINE FUMARATE 25 MG: 25 TABLET ORAL at 09:52

## 2022-07-30 RX ADMIN — ATORVASTATIN CALCIUM 10 MG: 10 TABLET, FILM COATED ORAL at 20:43

## 2022-07-30 RX ADMIN — ENOXAPARIN SODIUM 40 MG: 100 INJECTION SUBCUTANEOUS at 09:51

## 2022-07-30 RX ADMIN — DONEPEZIL HYDROCHLORIDE 5 MG: 5 TABLET, FILM COATED ORAL at 20:43

## 2022-07-30 RX ADMIN — AMLODIPINE BESYLATE 5 MG: 5 TABLET ORAL at 09:51

## 2022-07-30 RX ADMIN — QUETIAPINE FUMARATE 50 MG: 25 TABLET ORAL at 23:09

## 2022-07-30 RX ADMIN — POTASSIUM BICARBONATE 20 MEQ: 782 TABLET, EFFERVESCENT ORAL at 20:43

## 2022-07-30 ASSESSMENT — PAIN SCALES - GENERAL: PAINLEVEL_OUTOF10: 0

## 2022-07-30 NOTE — PROGRESS NOTES
Hospitalist Progress Note      Synopsis: Patient admitted on 7/26/2022         [facility]. Parish Singleton, a 65y.o. year old male  who  has no past medical history on file. Transferred from [facility] where he presented with altered mental status. Patient has a history of end-stage dementia. Patient's family reports that he has had several episodes of what they are calling seizure-like activity. These episodes involve trembling of the arms, rolling back of the eyes, incontinence of urine. After these episodes he appears to go back to his baseline, which is altered. Family reports the main thing is after his episode the unable to get him to go to sleep even after taking Ambien. Family states he has a history of seizures but is not currently on antiepileptics. Patient was transferred to [facility] for further neurological evaluation. Vital signs reveal the patient to be mildly tachycardic with a rate of 101 and hypertensive with pressures 176/89. He is afebrile. Laboratory studies from the sending facility reveal a WBC of 15.5, glucose 131, ALT 39. Urinalysis unremarkable. Troponin 25 with repeat of 17. Chest x-ray shows cardiomegaly. CT head was unremarkable. CTA head unremarkable. CT abdomen pelvis unremarkable. ically improving. Feeling better. Stable overnight. No other overnight issues reported. Subjective   Patient seen in the EEG room. He appeared to continue to be mildly confused. All of his imaging is from [place]    30 July  Remains confused but pleasant    Exam:  BP (!) 156/95   Pulse 77   Temp 96.8 °F (36 °C) (Temporal)   Resp 18   Ht 5' 8\" (1.727 m)   Wt 162 lb (73.5 kg)   SpO2 95%   BMI 24.63 kg/m²   General appearance: No apparent distress, appears stated age and cooperative. HEENT: Pupils equal, round, and reactive to light. Conjunctivae/corneas clear. Neck:. Trachea midline.   Respiratory: Decreased cardiovascular: Regular rate and rhythm with normal S1/S2 without murmurs, rubs or gallops. Abdomen: Soft, non-tender, non-distended with normal bowel sounds. Musculoskeletal: No clubbing, cyanosis or edema bilaterally. Able to move his extremities  Is confused   skin:  No rashes    Neurologic: awake, alert and following commands but appears weak    Medications:  Reviewed    Infusion Medications    sodium chloride       Scheduled Medications    potassium bicarb-citric acid  20 mEq Oral BID    amLODIPine  5 mg Oral Daily    levETIRAcetam  500 mg Oral BID    donepezil  5 mg Oral Nightly    QUEtiapine  25 mg Oral Daily    QUEtiapine  50 mg Oral Nightly    sodium chloride flush  5-40 mL IntraVENous 2 times per day    enoxaparin  40 mg SubCUTAneous Daily     PRN Meds: sodium chloride flush, sodium chloride, ondansetron **OR** ondansetron, polyethylene glycol, acetaminophen **OR** acetaminophen, diazePAM    I/O    Intake/Output Summary (Last 24 hours) at 7/30/2022 1153  Last data filed at 7/30/2022 1054  Gross per 24 hour   Intake 120 ml   Output 2800 ml   Net -2680 ml         Labs:   No results for input(s): WBC, HGB, HCT, PLT in the last 72 hours. Recent Labs     07/28/22  1732 07/29/22  0515    139   K 3.6 3.2*    105   CO2 27 25   BUN 11 12   CREATININE 1.0 0.8   CALCIUM 8.8 8.5*         No results for input(s): PROT, ALB, ALKPHOS, ALT, AST, BILITOT, AMYLASE, LIPASE in the last 72 hours. No results for input(s): INR in the last 72 hours. No results for input(s): Grecia Marcia in the last 72 hours. Chronic labs:  No results found for: CHOL, TRIG, HDL, LDLCALC, TSH, PSA, INR, LABA1C    Microbiology:  Pending  No results for input(s): BC in the last 72 hours. No results for input(s): ORG in the last 72 hours. No results for input(s): David Grates in the last 72 hours. No results for input(s): STREPNEUMAGU in the last 72 hours. No results for input(s): LP1UAG in the last 72 hours.   No results for input(s): ASO in the last 72 hours. No results for input(s): CULTRESP in the last 72 hours. No results for input(s): PROCAL in the last 72 hours. Radiology:  Imaging studies reviewed today. These are from [facility]    Clinical Interpretation: This abnormal study showed evidence of:     Mild nonspecific cerebral dysfunction of the right frontal region  A mild nonspecific encephalopathy     Structural abnormalities should be considered for the findings above and appropriate imaging obtained if clinically indicated. No seizures or epileptiform discharges were noted during this study. ASSESSMENT:    Principal Problem:    Seizure ([address]  Active Problems:    AMS (altered mental status)    Dementia (HCC)    Hypokalemia    Abnormal LFTs  Resolved Problems:    * No resolved hospital problems. *       PLAN:      1. EEG in progress  2. I did review his notes from his outpatient office visit he was on lisinopril. At this point we can resume with amlodipine for now. 3.  Also he has had a Wilkins's catheter. We need to probably check for a UA given his age and lack of self-care and mobility  4. Maintain Keppra in place  5. Await neurology  He would be a great candidate for a [facility] status with comfort care status with comfort meds. We can request palliative services to see    July 30  Remains confused at baseline. Started on [address]. EEG more consistent with encephalopathy pattern  With prior Auth for his discharge  Seems that he has had a pleasant stay over the last 48 hours. We can start a low-dose statin for his microangiopathic disease. Also LFTs could be drawn tomorrow      Diet: ADULT DIET;  Regular  Code Status: Full Code  PT/OT Eval Status:   Order  DVT Prophylaxis:   In place  Recommended disposition at discharge: Skilled facility    +++++++++++++++++++++++++++++++++++++++++++++++++  Edouard Rodriguez MD   [facility].  +++++++++++++++++++++++++++++++++++++++++++++++++  NOTE: This report was transcribed using voice recognition software. Every effort was made to ensure accuracy; however, inadvertent computerized transcription errors may be present.

## 2022-07-31 LAB
ALBUMIN SERPL-MCNC: 3.4 G/DL (ref 3.5–5.2)
ALP BLD-CCNC: 88 U/L (ref 40–129)
ALT SERPL-CCNC: 53 U/L (ref 0–40)
ANION GAP SERPL CALCULATED.3IONS-SCNC: 9 MMOL/L (ref 7–16)
AST SERPL-CCNC: 28 U/L (ref 0–39)
BASOPHILS ABSOLUTE: 0.07 E9/L (ref 0–0.2)
BASOPHILS RELATIVE PERCENT: 0.7 % (ref 0–2)
BILIRUB SERPL-MCNC: 0.2 MG/DL (ref 0–1.2)
BUN BLDV-MCNC: 18 MG/DL (ref 6–23)
CALCIUM SERPL-MCNC: 9.1 MG/DL (ref 8.6–10.2)
CHLORIDE BLD-SCNC: 102 MMOL/L (ref 98–107)
CO2: 27 MMOL/L (ref 22–29)
CREAT SERPL-MCNC: 0.9 MG/DL (ref 0.7–1.2)
EOSINOPHILS ABSOLUTE: 0 E9/L (ref 0.05–0.5)
EOSINOPHILS RELATIVE PERCENT: 0 % (ref 0–6)
GFR AFRICAN AMERICAN: >60
GFR NON-AFRICAN AMERICAN: >60 ML/MIN/1.73
GLUCOSE BLD-MCNC: 107 MG/DL (ref 74–99)
HCT VFR BLD CALC: 44.6 % (ref 37–54)
HEMOGLOBIN: 15.1 G/DL (ref 12.5–16.5)
IMMATURE GRANULOCYTES #: 0.06 E9/L
IMMATURE GRANULOCYTES %: 0.6 % (ref 0–5)
LYMPHOCYTES ABSOLUTE: 2.85 E9/L (ref 1.5–4)
LYMPHOCYTES RELATIVE PERCENT: 27.2 % (ref 20–42)
MCH RBC QN AUTO: 30.1 PG (ref 26–35)
MCHC RBC AUTO-ENTMCNC: 33.9 % (ref 32–34.5)
MCV RBC AUTO: 89 FL (ref 80–99.9)
MONOCYTES ABSOLUTE: 0.97 E9/L (ref 0.1–0.95)
MONOCYTES RELATIVE PERCENT: 9.2 % (ref 2–12)
NEUTROPHILS ABSOLUTE: 6.54 E9/L (ref 1.8–7.3)
NEUTROPHILS RELATIVE PERCENT: 62.3 % (ref 43–80)
PDW BLD-RTO: 12.1 FL (ref 11.5–15)
PLATELET # BLD: 264 E9/L (ref 130–450)
PMV BLD AUTO: 10.3 FL (ref 7–12)
POTASSIUM SERPL-SCNC: 4.5 MMOL/L (ref 3.5–5)
RBC # BLD: 5.01 E12/L (ref 3.8–5.8)
SODIUM BLD-SCNC: 138 MMOL/L (ref 132–146)
TOTAL PROTEIN: 6.8 G/DL (ref 6.4–8.3)
WBC # BLD: 10.5 E9/L (ref 4.5–11.5)

## 2022-07-31 PROCEDURE — 6360000002 HC RX W HCPCS: Performed by: FAMILY MEDICINE

## 2022-07-31 PROCEDURE — 85025 COMPLETE CBC W/AUTO DIFF WBC: CPT

## 2022-07-31 PROCEDURE — 6370000000 HC RX 637 (ALT 250 FOR IP): Performed by: PSYCHIATRY & NEUROLOGY

## 2022-07-31 PROCEDURE — 80053 COMPREHEN METABOLIC PANEL: CPT

## 2022-07-31 PROCEDURE — 97535 SELF CARE MNGMENT TRAINING: CPT

## 2022-07-31 PROCEDURE — 97530 THERAPEUTIC ACTIVITIES: CPT

## 2022-07-31 PROCEDURE — 6370000000 HC RX 637 (ALT 250 FOR IP): Performed by: INTERNAL MEDICINE

## 2022-07-31 PROCEDURE — 36415 COLL VENOUS BLD VENIPUNCTURE: CPT

## 2022-07-31 PROCEDURE — 1200000000 HC SEMI PRIVATE

## 2022-07-31 PROCEDURE — 2580000003 HC RX 258: Performed by: FAMILY MEDICINE

## 2022-07-31 RX ADMIN — ENOXAPARIN SODIUM 40 MG: 100 INJECTION SUBCUTANEOUS at 09:04

## 2022-07-31 RX ADMIN — LEVETIRACETAM 500 MG: 500 TABLET, FILM COATED ORAL at 09:05

## 2022-07-31 RX ADMIN — QUETIAPINE FUMARATE 25 MG: 25 TABLET ORAL at 09:04

## 2022-07-31 RX ADMIN — AMLODIPINE BESYLATE 5 MG: 5 TABLET ORAL at 09:05

## 2022-07-31 RX ADMIN — LEVETIRACETAM 500 MG: 500 TABLET, FILM COATED ORAL at 21:37

## 2022-07-31 RX ADMIN — DONEPEZIL HYDROCHLORIDE 5 MG: 5 TABLET, FILM COATED ORAL at 21:37

## 2022-07-31 RX ADMIN — ATORVASTATIN CALCIUM 10 MG: 10 TABLET, FILM COATED ORAL at 21:37

## 2022-07-31 RX ADMIN — QUETIAPINE FUMARATE 50 MG: 25 TABLET ORAL at 21:36

## 2022-07-31 RX ADMIN — POTASSIUM BICARBONATE 20 MEQ: 782 TABLET, EFFERVESCENT ORAL at 09:05

## 2022-07-31 RX ADMIN — SODIUM CHLORIDE, PRESERVATIVE FREE 10 ML: 5 INJECTION INTRAVENOUS at 09:05

## 2022-07-31 ASSESSMENT — PAIN SCALES - GENERAL
PAINLEVEL_OUTOF10: 0
PAINLEVEL_OUTOF10: 0

## 2022-07-31 NOTE — PROGRESS NOTES
Occupational Therapy  OT BEDSIDE TREATMENT NOTE    [address] [address] [address]  Patient Name: Monica Mercedes  MRN: [id number]  : 1945  Room: [address] [place] [address]-A     Per OT Eval:    Evaluating OT: Mustapha Palacio OTR/L; [id number]       Referring Provider: Mingo Acevedo DO    Specific Provider Orders/Date: OT Eval and Treat 22       Diagnosis: Seizure; AMS (altered mental status); Dementia; Hypokalemia; Abnormal LFTs    Surgery: None this admission     Pertinent Medical History:  has no past medical history on file.      Recommended Adaptive Equipment:       Precautions:  Fall Risk, cognition, +alarms, bedside sitter, goodman, seizure precautions      Assessment of current deficits   [x] Functional mobility            [x]ADLs           [x] Strength                  []Cognition   [x] Functional transfers          [x] IADLs         [x] Safety Awareness   [x]Endurance   [] Fine Coordination                         [x] Balance      [] Vision/perception   []Sensation      []Gross Motor Coordination             [] ROM           [] Delirium                   [] Motor Control     OT PLAN OF CARE   OT POC based on physician orders, patient diagnosis and results of clinical assessment     Frequency/Duration 1-3 days/wk for 2 weeks PRN  Specific OT Treatment Interventions to include:   * Instruction/training on adapted ADL techniques and AE recommendations to increase functional independence within precautions       * Training on energy conservation strategies, correct breathing pattern and techniques to improve independence/tolerance for self-care routine  * Functional transfer/mobility training/DME recommendations for increased independence, safety, and fall prevention  * Patient/Family education to increase follow through with safety techniques and functional independence  * Recommendation of environmental modifications for increased safety with functional transfers/mobility and ADLs  * Therapeutic exercise to improve motor endurance, ROM, and functional strength for ADLs/functional transfers  * Therapeutic activities to facilitate/challenge dynamic balance, stand tolerance for increased safety and independence with ADLs  * Positioning to improve skin integrity, interaction with environment and functional independence     Pt questionable historian. Home Living: Pt stating lives with mother however per chart review pt lives with wife & daughter. Bathroom setup: ?   Equipment owned: ?  Prior Level of Function: ? with ADLs , ? with IADLs; engaged in functional mobility with use of  ? Driving: ?  Occupation: ?     Pain Level: Pt did not complain of pain this session  Cognition: A&O: 1/4; Follows 1-2 step directions with min cuing for redirection to task - pt appears pleasantly confused at times.               Memory:  Poor              Sequencing:  Poor              Problem solving:  Poor              Judgement/safety:  Poor                Functional Assessment:  AM-PAC Daily Activity Raw Score: 15/24    Initial Eval Status  Date: 7/27/22 Treatment Status  Date: 7/31/22 STGs = LTGs  Time frame: 10-14 days   Feeding Setup  Setup Independent    Grooming Minimal Assist  Cooper  Pt washed face, applied deodorant, brushed teeth, max cueing for follow through, poor processing Modified [place]    UB Dressing Minimal Assist  Cooper  Adam/doff hospital gown seated upright in chair Modified [place]    LB Dressing Moderate Assist SBA-socks  Adam/doff hospital socks using cross over technique with cueing for follow through    DNT pants this date, modA per previous session Modified [place]    Bathing Moderate Assist modA  Simulated Task    Pt able to wash of UB, using cross over technique to wash of LB, assistance to stand and wash of buttocks/leonela area Modified [place]    Toileting Maximal Assist  maxA  Pt having of goodman catheter Modified [place]    Bed Mobility Supine to sit: Minimal Assist   Sit to supine: Minimal Assist  SBA  Supine<>EOB  EOB<>Supine  Supine to sit: Independent   Sit to supine: Independent    Functional Transfers Sit to stand:Minimal Assist   Stand to sit:Minimal Assist  Stand pivot: NT  Commode: NT  Cooper/CGA  Sit to Stand  Stand to Sit      Stand Pivot Transfer EOB<>Chair with w.w. Cueing for hand placement Sit to stand: Modified [place]   Stand to sit:Modified [place]   Stand pivot: Modified [place]   Commode: Modified [place]     Functional Mobility Minimal Assist  Use of ww ~5 side steps towards HOB. Cooper/CGA  Pt ambulated of short steps during SPT with w.w, cueing for safety and walker management Modified [place] with use of Appropriate AD.    Balance Sitting:     Static - SBA     Dynamic - Minimal Assist  Standing: Minimal Assist Sitting EOB:  SBA    Standing:  Cooper/CGA  Sitting:     Static: Independent     Dynamic: Independent  Standing: Modified [place]    Activity Tolerance Fair  Fair- Good   Visual/  Perceptual Glasses: Yes  Appears WFL        Safety Poor   Good  during ADL completion      Hand Dominance Right    AROM (PROM) Strength Additional Info: Goal:   RUE WFL 4-/5 grossly tested good  and wfl FMC/dexterity noted during ADL tasks    Improve overall RUE strength FL for participation in functional tasks         LUE WFL 4-/5 grossly tested Good  and wfl FMC/dexterity noted during ADL tasks    Improve overall LUE strength WFL for participation in functional tasks         Hearing: [facility]  Sensation:  No c/o numbness or tingling BUE  Tone: WFL BUE  Edema: Unremarkable    Education:  Pt was educated on role of OT, goals to be reached, safety with bed mobility, safety and hand placement with transfsers, safety and walker management with functional mobility, and techniques to assist with LB dressing task    Comments: Upon arrival pt seated upright tin bed, agreeable to therapy, speaking with nursing okaying pt to be seen this session. Pt completed of bed mobility, functional mobility, transfers and ADL tasks this session. Pt being confused throughout session, having of poor processing, requiring of max cueing for follow through to complete of all tasks. At end of session, pt seated upright in bed, all lines and tubes intact, call light within reach. Pt has made fair progress towards set goals.    Continue with current plan of care focusing on increasing of independency with transfers and ADL tasks      Treatment Time In: 9:35am           Treatment Time Out: 9:54am                Treatment Charges: Mins Units   Ther Ex  61521     Manual Therapy 36923     Thera Activities 08990     ADL/Home Mgt 38800 19 1   Neuro Re-ed 00215     Group Therapy      Orthotic manage/training  28093     Non-Billable Time     Total Timed Treatment 19 1        Debbie PATINO/UMA [id number]

## 2022-07-31 NOTE — PROGRESS NOTES
Physical Therapy  Treatment Session     Name: Monica Mercedes  : 1945  MRN: [id number]    Date of Service: 2022  Evaluating PT:  Mohini Jacobs, PT, DPT [id number]    Room #:  6304/2069-U  Diagnosis:  Seizure ([address] [R56.9]  PMHx/PSHx:  History reviewed. No pertinent past medical history. Precautions:  Fall risk, Alarms, Cognition, TSM  Equipment Needs:  FWW    SUBJECTIVE:  Pt lives with wife at home. Social history limited due to cognition. OBJECTIVE:   Initial Evaluation  Date: 22 Treatment  22 Short Term/ Long Term   Goals   AM-PAC 6 Clicks  89/88    Was pt agreeable to Eval/treatment? Yes Yes    Does pt have pain? No No c/o pain    Bed Mobility  Rolling: NT  Supine to sit: SBA  Sit to supine: SBA  Scooting: SBA Rolling: NT  Supine to sit: SBA  Sit to supine: SBA  Scooting: SBA Rolling: Supervision  Supine to sit: Supervision  Sit to supine: Supervision  Scooting: Supervision   Transfers Sit to stand: Mod A  Stand to sit: Mod A  Stand pivot: Mod A with HHA Sit to stand: Tj  Stand to sit: MInAA  Stand pivot: Mod A with HHA Sit to stand: Supervision  Stand to sit: Supervision  Stand pivot: Supervision with AAD   Ambulation    2 feet side stepping with HHA Mod A 100 feet x 3 reps with [employer] with Tj/ ModA >50 feet with AAD Mod I   Stair negotiation: ascended and descended NT 4 steps with 2 HR x 2 reps with Tj 12 steps with bilateral rail Supervision   ROM BUE:  Refer to OT   BLE:  WFL WNL    Strength BUE:  Refer to OT  BLE:  limited due to inability to follow commands  4/5 BLE:  limited due to inability to follow commands, grossly  4/5 4+/5   Balance Sitting EOB:  SBA  Dynamic Standing:   Mod A with HHA  Sitting EOB:  Supervision  Dynamic Standing:  Supervision AAD     Pt is A & O x 1 (self)  Sensation:  Pt denies numbness and tingling to extremities  Edema:  none    Patient education  Pt educated on safe AD approximation and functional implications    Patient response to education:   Pt goals.    CPT codes:  [] Low Complexity PT evaluation 49490  [] Moderate Complexity PT evaluation 93416  [] High Complexity PT evaluation 09110  [] PT Re-evaluation 52771  [] Gait training 67899 - minutes  [] Manual therapy 12689 - minutes  [x] Therapeutic activities 64376 23 minutes  [] Therapeutic exercises 07893 - minutes  [] Neuromuscular reeducation 17646 - minutes     Linda Hussein, PT, DPT  Board Certified Neurologic Clinical Specialist  [phone]

## 2022-07-31 NOTE — PLAN OF CARE
Problem: Discharge Planning  Goal: Discharge to home or other facility with appropriate resources  Outcome: Progressing     Problem: Safety - Adult  Goal: Free from fall injury  Outcome: Progressing     Problem: Skin/Tissue Integrity  Goal: Absence of new skin breakdown  Description: 1. Monitor for areas of redness and/or skin breakdown  2. Assess vascular access sites hourly  3. Every 4-6 hours minimum:  Change oxygen saturation probe site  4. Every 4-6 hours:  If on nasal continuous positive airway pressure, respiratory therapy assess nares and determine need for appliance change or resting period. Outcome: Progressing     Problem: Confusion  Goal: Confusion, delirium, dementia, or psychosis is improved or at baseline  Description: INTERVENTIONS:  1. Assess for possible contributors to thought disturbance, including medications, impaired vision or hearing, underlying metabolic abnormalities, dehydration, psychiatric diagnoses, and notify attending LIP  2. [place] high risk fall precautions, as indicated  3. Provide frequent short contacts to provide reality reorientation, refocusing and direction  4. Decrease environmental stimuli, including noise as appropriate  5. Monitor and intervene to maintain adequate nutrition, hydration, elimination, sleep and activity  6. If unable to ensure safety without constant attention obtain sitter and review sitter guidelines with assigned personnel  7.  Initiate Psychosocial CNS and Spiritual Care consult, as indicated  Outcome: Progressing     Problem: Pain  Goal: Verbalizes/displays adequate comfort level or baseline comfort level  Outcome: Progressing

## 2022-07-31 NOTE — PROGRESS NOTES
8\" (1.727 m)   Wt 162 lb (73.5 kg)   SpO2 99%   BMI 24.63 kg/m²   General appearance: No apparent distress, appears stated age and cooperative. HEENT: Pupils equal, round, and reactive to light. Conjunctivae/corneas clear. Neck: Supple. No jugular venous distention. Trachea midline. Respiratory:  Normal respiratory effort. Clear to auscultation, bilaterally without Rales/Wheezes/Rhonchi. Cardiovascular: Regular rate and rhythm with normal S1/S2 without murmurs, rubs or gallops. Abdomen: Soft, non-tender, non-distended with normal bowel sounds. Musculoskeletal: No clubbing, cyanosis or edema bilaterally. Brisk capillary refill. 2+ lower extremity pulses (dorsalis pedis). Skin:  No rashes    Neurologic: awake, alert and following commands     Medications:  Reviewed    Infusion Medications    sodium chloride       Scheduled Medications    atorvastatin  10 mg Oral Nightly    potassium bicarb-citric acid  20 mEq Oral BID    amLODIPine  5 mg Oral Daily    levETIRAcetam  500 mg Oral BID    donepezil  5 mg Oral Nightly    QUEtiapine  25 mg Oral Daily    QUEtiapine  50 mg Oral Nightly    sodium chloride flush  5-40 mL IntraVENous 2 times per day    enoxaparin  40 mg SubCUTAneous Daily     PRN Meds: sodium chloride flush, sodium chloride, ondansetron **OR** ondansetron, polyethylene glycol, acetaminophen **OR** acetaminophen, diazePAM    I/O    Intake/Output Summary (Last 24 hours) at 7/31/2022 1836  Last data filed at 7/31/2022 1803  Gross per 24 hour   Intake 350 ml   Output 2200 ml   Net -1850 ml       Labs:   Recent Labs     07/31/22  1348   WBC 10.5   HGB 15.1   HCT 44.6          Recent Labs     07/29/22  0515 07/31/22  0541    138   K 3.2* 4.5    102   CO2 25 27   BUN 12 18   CREATININE 0.8 0.9   CALCIUM 8.5* 9.1       Recent Labs     07/31/22  0541   PROT 6.8   ALKPHOS 88   ALT 53*   AST 28   BILITOT 0.2       No results for input(s): INR in the last 72 hours.     No results for input(s): Patrisha Meigs in the last 72 hours. Chronic labs:  No results found for: CHOL, TRIG, HDL, LDLCALC, TSH, PSA, INR, LABA1C    Radiology:  Imaging studies reviewed today.        +++++++++++++++++++++++++++++++++++++++++++++++++  Elvis Richard, [facility].  +++++++++++++++++++++++++++++++++++++++++++++++++  NOTE: This report was transcribed using voice recognition software. Every effort was made to ensure accuracy; however, inadvertent computerized transcription errors may be present.

## 2022-08-01 LAB
ALBUMIN SERPL-MCNC: 3.5 G/DL (ref 3.5–5.2)
ALP BLD-CCNC: 99 U/L (ref 40–129)
ALT SERPL-CCNC: 51 U/L (ref 0–40)
ANION GAP SERPL CALCULATED.3IONS-SCNC: 10 MMOL/L (ref 7–16)
AST SERPL-CCNC: 28 U/L (ref 0–39)
BASOPHILS ABSOLUTE: 0.07 E9/L (ref 0–0.2)
BASOPHILS RELATIVE PERCENT: 0.6 % (ref 0–2)
BILIRUB SERPL-MCNC: 0.2 MG/DL (ref 0–1.2)
BUN BLDV-MCNC: 21 MG/DL (ref 6–23)
CALCIUM SERPL-MCNC: 9.1 MG/DL (ref 8.6–10.2)
CHLORIDE BLD-SCNC: 108 MMOL/L (ref 98–107)
CO2: 24 MMOL/L (ref 22–29)
CREAT SERPL-MCNC: 0.9 MG/DL (ref 0.7–1.2)
EOSINOPHILS ABSOLUTE: 0 E9/L (ref 0.05–0.5)
EOSINOPHILS RELATIVE PERCENT: 0 % (ref 0–6)
GFR AFRICAN AMERICAN: >60
GFR NON-AFRICAN AMERICAN: >60 ML/MIN/1.73
GLUCOSE BLD-MCNC: 111 MG/DL (ref 74–99)
HCT VFR BLD CALC: 41.9 % (ref 37–54)
HEMOGLOBIN: 13.9 G/DL (ref 12.5–16.5)
IMMATURE GRANULOCYTES #: 0.05 E9/L
IMMATURE GRANULOCYTES %: 0.4 % (ref 0–5)
LYMPHOCYTES ABSOLUTE: 2.09 E9/L (ref 1.5–4)
LYMPHOCYTES RELATIVE PERCENT: 18.3 % (ref 20–42)
MCH RBC QN AUTO: 30.4 PG (ref 26–35)
MCHC RBC AUTO-ENTMCNC: 33.2 % (ref 32–34.5)
MCV RBC AUTO: 91.7 FL (ref 80–99.9)
MONOCYTES ABSOLUTE: 0.97 E9/L (ref 0.1–0.95)
MONOCYTES RELATIVE PERCENT: 8.5 % (ref 2–12)
NEUTROPHILS ABSOLUTE: 8.22 E9/L (ref 1.8–7.3)
NEUTROPHILS RELATIVE PERCENT: 72.2 % (ref 43–80)
PDW BLD-RTO: 12.2 FL (ref 11.5–15)
PLATELET # BLD: 413 E9/L (ref 130–450)
PMV BLD AUTO: 9.6 FL (ref 7–12)
POTASSIUM REFLEX MAGNESIUM: 4.4 MMOL/L (ref 3.5–5)
RBC # BLD: 4.57 E12/L (ref 3.8–5.8)
SODIUM BLD-SCNC: 142 MMOL/L (ref 132–146)
TOTAL PROTEIN: 6.9 G/DL (ref 6.4–8.3)
WBC # BLD: 11.4 E9/L (ref 4.5–11.5)

## 2022-08-01 PROCEDURE — 36415 COLL VENOUS BLD VENIPUNCTURE: CPT

## 2022-08-01 PROCEDURE — 6370000000 HC RX 637 (ALT 250 FOR IP): Performed by: INTERNAL MEDICINE

## 2022-08-01 PROCEDURE — 85025 COMPLETE CBC W/AUTO DIFF WBC: CPT

## 2022-08-01 PROCEDURE — 80053 COMPREHEN METABOLIC PANEL: CPT

## 2022-08-01 PROCEDURE — 1200000000 HC SEMI PRIVATE

## 2022-08-01 PROCEDURE — 6360000002 HC RX W HCPCS: Performed by: FAMILY MEDICINE

## 2022-08-01 PROCEDURE — 6370000000 HC RX 637 (ALT 250 FOR IP): Performed by: PSYCHIATRY & NEUROLOGY

## 2022-08-01 RX ADMIN — LEVETIRACETAM 500 MG: 500 TABLET, FILM COATED ORAL at 20:51

## 2022-08-01 RX ADMIN — QUETIAPINE FUMARATE 25 MG: 25 TABLET ORAL at 09:16

## 2022-08-01 RX ADMIN — DONEPEZIL HYDROCHLORIDE 5 MG: 5 TABLET, FILM COATED ORAL at 20:51

## 2022-08-01 RX ADMIN — AMLODIPINE BESYLATE 5 MG: 5 TABLET ORAL at 09:16

## 2022-08-01 RX ADMIN — QUETIAPINE FUMARATE 50 MG: 25 TABLET ORAL at 20:51

## 2022-08-01 RX ADMIN — ATORVASTATIN CALCIUM 10 MG: 10 TABLET, FILM COATED ORAL at 20:51

## 2022-08-01 RX ADMIN — LEVETIRACETAM 500 MG: 500 TABLET, FILM COATED ORAL at 09:15

## 2022-08-01 RX ADMIN — ENOXAPARIN SODIUM 40 MG: 100 INJECTION SUBCUTANEOUS at 09:16

## 2022-08-01 ASSESSMENT — PAIN SCALES - GENERAL: PAINLEVEL_OUTOF10: 0

## 2022-08-01 NOTE — PROGRESS NOTES
Hospitalist Progress Note      Synopsis: Patient admitted on 7/26/2022   Transferred from [facility] where he presented with altered mental status. Patient has a history of end-stage dementia. Patient's family reports that he has had several episodes of what they are calling seizure-like activity. These episodes involve trembling of the arms, rolling back of the eyes, incontinence of urine. After these episodes he appears to go back to his baseline, which is altered. Family reports the main thing is after his episode the unable to get him to go to sleep even after taking Ambien. Family states he has a history of seizures but is not currently on antiepileptics. Patient was transferred to [facility] for further neurological evaluation. Vital signs reveal the patient to be mildly tachycardic with a rate of 101 and hypertensive with pressures 176/89. He is afebrile. Laboratory studies from the sending facility reveal a WBC of 15.5, glucose 131, ALT 39. Urinalysis unremarkable. Troponin 25 with repeat of 17. Chest x-ray shows cardiomegaly. CT head was unremarkable. CTA head unremarkable. CT abdomen pelvis unremarkable. ically improving. Feeling better. Stable overnight. No other overnight issues reported. ASSESSMENT AND PLAN:    Seizure Activity/Acute Encephalopathy   -Continue Keppra  -Neurology has underlying Alzheimer's Disease  -Needs placement.   -Seroquel   -Aricept     Elevated LFTs  -Improving  -CTM    HTN  Norvasc    HLD  Lipitor. Disposition  Awaiting placement     Diet: ADULT DIET; Regular  Code Status: Full Code  PT/OT Eval Status:   12  DVT Prophylaxis:   Lovenox  Recommended disposition at discharge:  ZAKI       Subjective    Patient remains baseline confused. Follows commands. Answers most questions. Calm, cooperative, but confused. Family at bedside. Case discussed with family.      Exam:  /76   Pulse 85   Temp 98.3 °F (36.8 °C) (Temporal)   Resp 18   Ht 5' 8\" (1.727 m)   Wt 162 lb (73.5 kg)   SpO2 98%   BMI 24.63 kg/m²   General appearance: No apparent distress, appears stated age and cooperative. HEENT: Pupils equal, round, and reactive to light. Conjunctivae/corneas clear. Neck: Supple. No jugular venous distention. Trachea midline. Respiratory:  Normal respiratory effort. Clear to auscultation, bilaterally without Rales/Wheezes/Rhonchi. Cardiovascular: Regular rate and rhythm with normal S1/S2 without murmurs, rubs or gallops. Abdomen: Soft, non-tender, non-distended with normal bowel sounds. Musculoskeletal: No clubbing, cyanosis or edema bilaterally. Brisk capillary refill. 2+ lower extremity pulses (dorsalis pedis).    Skin:  No rashes    Neurologic: awake, alert and following commands     Medications:  Reviewed    Infusion Medications    sodium chloride       Scheduled Medications    atorvastatin  10 mg Oral Nightly    amLODIPine  5 mg Oral Daily    levETIRAcetam  500 mg Oral BID    donepezil  5 mg Oral Nightly    QUEtiapine  25 mg Oral Daily    QUEtiapine  50 mg Oral Nightly    sodium chloride flush  5-40 mL IntraVENous 2 times per day    enoxaparin  40 mg SubCUTAneous Daily     PRN Meds: sodium chloride flush, sodium chloride, ondansetron **OR** ondansetron, polyethylene glycol, acetaminophen **OR** acetaminophen, diazePAM    I/O    Intake/Output Summary (Last 24 hours) at 8/1/2022 1854  Last data filed at 8/1/2022 1400  Gross per 24 hour   Intake 360 ml   Output 750 ml   Net -390 ml       Labs:   Recent Labs     07/31/22  1348 08/01/22  0455   WBC 10.5 11.4   HGB 15.1 13.9   HCT 44.6 41.9    413       Recent Labs     07/31/22  0541 08/01/22  0455    142   K 4.5 4.4    108*   CO2 27 24   BUN 18 21   CREATININE 0.9 0.9   CALCIUM 9.1 9.1       Recent Labs     07/31/22  0541 08/01/22  0455   PROT 6.8 6.9   ALKPHOS 88 99   ALT 53* 51*   AST 28 28   BILITOT 0.2 0.2       No results for input(s): INR in the last 72 hours. No results for input(s): Ekaterina Meigs in the last 72 hours. Chronic labs:  No results found for: CHOL, TRIG, HDL, LDLCALC, TSH, PSA, INR, LABA1C    Radiology:  Imaging studies reviewed today.        +++++++++++++++++++++++++++++++++++++++++++++++++  Elvis Richard, [facility].  +++++++++++++++++++++++++++++++++++++++++++++++++  NOTE: This report was transcribed using voice recognition software. Every effort was made to ensure accuracy; however, inadvertent computerized transcription errors may be present.

## 2022-08-01 NOTE — DISCHARGE INSTR - COC
Continuity of Care Form    Patient Name: Rafita Khan   :  1945  MRN:  [id number]    Admit date:  2022  Discharge date:  ***    Code Status Order: Full Code   Advance Directives:     Admitting Physician:  Raffaele Bolden DO  PCP: No primary care provider on file. Discharging Nurse: [facility] Unit/Room#: 65/56-A  Discharging Unit Phone Number: ***    Emergency Contact:   Extended Emergency Contact Information  Primary Emergency Contact: german lizama  Mobile Phone: [phone]  Relation: Spouse  Secondary Emergency Contact: chi kendrick  Home Phone: [phone]  Relation: Child    Past Surgical History:  No past surgical history on file. Immunization History: There is no immunization history on file for this patient. Active Problems:  Patient Active Problem List   Diagnosis Code    Seizure ([address] R56.9    AMS (altered mental status) R41.82    Dementia ([facility] [address] F03.90    Hypokalemia E87.6    Abnormal LFTs R94.5       Isolation/Infection:   Isolation            No Isolation          Patient Infection Status       None to display            Nurse Assessment:  Last Vital Signs: BP (!) 144/84   Pulse 82   Temp 97.7 °F (36.5 °C) (Temporal)   Resp 16   Ht 5' 8\" (1.727 m)   Wt 162 lb (73.5 kg)   SpO2 97%   BMI 24.63 kg/m²     Last documented pain score (0-10 scale): Pain Level: 0  Last Weight:   Wt Readings from Last 1 Encounters:   22 162 lb (73.5 kg)     Mental Status:  {IP PT MENTAL STATUS:}    IV Access:  { LIBRADO IV ACCESS:921760391}    Nursing Mobility/ADLs:  Walking   {P DME HQLH:091958043}  Transfer  {P DME RGSF:891378353}  Bathing  {P DME KBAA:025985970}  Dressing  {P DME BHRI:431028687}  Toileting  {P DME AKQE:438327355}  Feeding  {P DME ZMVR:224642859}  Med Admin  {[facility] DME ANFC:596613852}  Med Delivery   { LIBRADO MED Delivery:031578148}    Wound Care Documentation and Therapy:        Elimination:  Continence:    Bowel: {YES / UP:79898}  Bladder: {YES / KWADWO:32943}  Urinary Catheter: {Urinary Catheter:060433228}   Colostomy/Ileostomy/Ileal Conduit: {YES / ZJ:26763}       Date of Last BM: ***    Intake/Output Summary (Last 24 hours) at 2022 0647  Last data filed at 2022 0636  Gross per 24 hour   Intake 350 ml   Output 1100 ml   Net -750 ml     I/O last 3 completed shifts:   In: 350 [P.O.:350]  Out: 3200 [Urine:3200]    Safety Concerns:     [address] Safety Concerns:125447726}    Impairments/Disabilities:      [address] Impairments/Disabilities:400261517}    Nutrition Therapy:  Current Nutrition Therapy:   [address] [facility] Diet List:446581127}    Routes of Feeding: {CHP DME Other Feedings:451857130}  Liquids: {Slp liquid thickness:85566}  Daily Fluid Restriction: {CHP DME Yes amt example:594735785}  Last Modified Barium Swallow with Video (Video Swallowing Test): {Done Not Done DLAW:117933306}    Treatments at the Time of Hospital Discharge:   Respiratory Treatments: ***  Oxygen Therapy:  {Therapy; copd oxygen:00548}  Ventilator:    {[facility] Vent CHEZ:960011628}    Rehab Therapies: {THERAPEUTIC INTERVENTION:7115528601}  Weight Bearing Status/Restrictions: [address]  Weight Bearin}  Other Medical Equipment (for information only, NOT a DME order):  {EQUIPMENT:493828847}  Other Treatments: ***    Patient's personal belongings (please select all that are sent with patient):  {P DME Belongings:442605068}    RN SIGNATURE:  {Esignature:042481366}    CASE MANAGEMENT/SOCIAL WORK SECTION    Inpatient Status Date: ***    Readmission Risk Assessment Score:  Readmission Risk              Risk of Unplanned Readmission:  [id number]           Discharging to Facility/ Agency   Name:   Address:  Phone:  Fax:    Dialysis Facility (if applicable)   Name:  Address:  Dialysis Schedule:  Phone:  Fax:    / signature: {Esignature:895407767}    PHYSICIAN SECTION    Prognosis: {Prognosis:1676904790}    Condition at Discharge: [address] Patient Condition:634504028}    Rehab Potential (if transferring to Rehab): {Prognosis:7417495789}    Recommended Labs or Other Treatments After Discharge: ***    Physician Certification: I certify the above information and transfer of Adrian Arias  is necessary for the continuing treatment of the diagnosis listed and that he requires {Admit to Appropriate Level of Care:05572} for {GREATER/LESS:702582285} 30 days.      Update Admission H&P: {CHP DME Changes in XYIOX:569026305}    PHYSICIAN SIGNATURE:  {Esignature:836579987}

## 2022-08-01 NOTE — CARE COORDINATION
Discharge plan is [facility]. Insurance precert pending.  Electronically signed by Alvin Govea RN on 8/1/2022 at 3:59 PM

## 2022-08-02 VITALS
BODY MASS INDEX: 24.55 KG/M2 | OXYGEN SATURATION: 100 % | DIASTOLIC BLOOD PRESSURE: 96 MMHG | HEART RATE: 77 BPM | SYSTOLIC BLOOD PRESSURE: 127 MMHG | HEIGHT: 68 IN | WEIGHT: 162 LBS | RESPIRATION RATE: 17 BRPM | TEMPERATURE: 97.9 F

## 2022-08-02 LAB
ANION GAP SERPL CALCULATED.3IONS-SCNC: 9 MMOL/L (ref 7–16)
BASOPHILS ABSOLUTE: 0.08 E9/L (ref 0–0.2)
BASOPHILS RELATIVE PERCENT: 0.8 % (ref 0–2)
BUN BLDV-MCNC: 24 MG/DL (ref 6–23)
CALCIUM SERPL-MCNC: 9 MG/DL (ref 8.6–10.2)
CHLORIDE BLD-SCNC: 105 MMOL/L (ref 98–107)
CO2: 23 MMOL/L (ref 22–29)
CREAT SERPL-MCNC: 0.9 MG/DL (ref 0.7–1.2)
EOSINOPHILS ABSOLUTE: 0.19 E9/L (ref 0.05–0.5)
EOSINOPHILS RELATIVE PERCENT: 1.8 % (ref 0–6)
GFR AFRICAN AMERICAN: >60
GFR NON-AFRICAN AMERICAN: >60 ML/MIN/1.73
GLUCOSE BLD-MCNC: 115 MG/DL (ref 74–99)
HCT VFR BLD CALC: 41.9 % (ref 37–54)
HEMOGLOBIN: 13.7 G/DL (ref 12.5–16.5)
IMMATURE GRANULOCYTES #: 0.03 E9/L
IMMATURE GRANULOCYTES %: 0.3 % (ref 0–5)
LYMPHOCYTES ABSOLUTE: 2.89 E9/L (ref 1.5–4)
LYMPHOCYTES RELATIVE PERCENT: 28.1 % (ref 20–42)
MCH RBC QN AUTO: 30.4 PG (ref 26–35)
MCHC RBC AUTO-ENTMCNC: 32.7 % (ref 32–34.5)
MCV RBC AUTO: 92.9 FL (ref 80–99.9)
MONOCYTES ABSOLUTE: 0.98 E9/L (ref 0.1–0.95)
MONOCYTES RELATIVE PERCENT: 9.5 % (ref 2–12)
NEUTROPHILS ABSOLUTE: 6.11 E9/L (ref 1.8–7.3)
NEUTROPHILS RELATIVE PERCENT: 59.5 % (ref 43–80)
PDW BLD-RTO: 12.2 FL (ref 11.5–15)
PLATELET # BLD: 407 E9/L (ref 130–450)
PMV BLD AUTO: 9.5 FL (ref 7–12)
POTASSIUM SERPL-SCNC: 4.4 MMOL/L (ref 3.5–5)
RBC # BLD: 4.51 E12/L (ref 3.8–5.8)
SODIUM BLD-SCNC: 137 MMOL/L (ref 132–146)
WBC # BLD: 10.3 E9/L (ref 4.5–11.5)

## 2022-08-02 PROCEDURE — 85025 COMPLETE CBC W/AUTO DIFF WBC: CPT

## 2022-08-02 PROCEDURE — 6360000002 HC RX W HCPCS: Performed by: FAMILY MEDICINE

## 2022-08-02 PROCEDURE — 6370000000 HC RX 637 (ALT 250 FOR IP): Performed by: PSYCHIATRY & NEUROLOGY

## 2022-08-02 PROCEDURE — 36415 COLL VENOUS BLD VENIPUNCTURE: CPT

## 2022-08-02 PROCEDURE — 80048 BASIC METABOLIC PNL TOTAL CA: CPT

## 2022-08-02 PROCEDURE — 6370000000 HC RX 637 (ALT 250 FOR IP): Performed by: INTERNAL MEDICINE

## 2022-08-02 RX ORDER — QUETIAPINE FUMARATE 25 MG/1
25 TABLET, FILM COATED ORAL DAILY
Qty: 60 TABLET | Refills: 0 | Status: SHIPPED | OUTPATIENT
Start: 2022-08-03

## 2022-08-02 RX ORDER — AMLODIPINE BESYLATE 5 MG/1
5 TABLET ORAL DAILY
Qty: 30 TABLET | Refills: 0 | Status: SHIPPED | OUTPATIENT
Start: 2022-08-03

## 2022-08-02 RX ORDER — DONEPEZIL HYDROCHLORIDE 5 MG/1
5 TABLET, FILM COATED ORAL NIGHTLY
Qty: 30 TABLET | Refills: 0 | Status: SHIPPED | OUTPATIENT
Start: 2022-08-02

## 2022-08-02 RX ORDER — LEVETIRACETAM 500 MG/1
500 TABLET ORAL 2 TIMES DAILY
Qty: 60 TABLET | Refills: 0 | Status: SHIPPED | OUTPATIENT
Start: 2022-08-02

## 2022-08-02 RX ORDER — QUETIAPINE FUMARATE 50 MG/1
50 TABLET, FILM COATED ORAL NIGHTLY
Qty: 60 TABLET | Refills: 0 | Status: SHIPPED | OUTPATIENT
Start: 2022-08-02

## 2022-08-02 RX ADMIN — QUETIAPINE FUMARATE 25 MG: 25 TABLET ORAL at 09:41

## 2022-08-02 RX ADMIN — AMLODIPINE BESYLATE 5 MG: 5 TABLET ORAL at 09:41

## 2022-08-02 RX ADMIN — LEVETIRACETAM 500 MG: 500 TABLET, FILM COATED ORAL at 09:41

## 2022-08-02 RX ADMIN — ENOXAPARIN SODIUM 40 MG: 100 INJECTION SUBCUTANEOUS at 09:41

## 2022-08-02 ASSESSMENT — PAIN SCALES - WONG BAKER: WONGBAKER_NUMERICALRESPONSE: 0

## 2022-08-02 NOTE — PROGRESS NOTES
Pt's family states that the pt is not taking aricept and that they do not want the medication filled. Pt's family stated that he was taken off of the medication by an outpatient doctor. Family is upset that he was denied going to a rehab center.

## 2022-08-02 NOTE — CARE COORDINATION
Denied by insurance for rehab. Wife notified. The pt will be returning home with family.  Electronically signed by Jeramie Marte RN on 8/2/2022 at 12:40 PM

## 2022-08-02 NOTE — DISCHARGE SUMMARY
Hospitalist Discharge Summary    Patient ID: Maribel Medrano   Patient : [id number]  Patient's PCP: No primary care provider on file. Admit Date: 2022   Admitting Physician: Geeta Bean DO    Discharge Date:  2022   Discharge Physician: Brayan Hagan MD   Discharge Condition: Stable  Discharge Disposition: [facility] course in brief:  (Please refer to daily progress notes for a comprehensive review of the hospitalization by requesting medical records)   Patient admitted on 2022   Transferred from [facility] where he presented with altered mental status. Patient has a history of end-stage dementia. Patient's family reports that he has had several episodes of what they are calling seizure-like activity. These episodes involve trembling of the arms, rolling back of the eyes, incontinence of urine. After these episodes he appears to go back to his baseline, which is altered. Family reports the main thing is after his episode the unable to get him to go to sleep even after taking Ambien. Family states he has a history of seizures but is not currently on antiepileptics. Patient was transferred to [facility] for further neurological evaluation. Vital signs reveal the patient to be mildly tachycardic with a rate of 101 and hypertensive with pressures 176/89. He is afebrile. Laboratory studies from the sending facility reveal a WBC of 15.5, glucose 131, ALT 39. Urinalysis unremarkable. Troponin 25 with repeat of 17. Chest x-ray shows cardiomegaly. CT head was unremarkable. CTA head unremarkable. CT abdomen pelvis unremarkable. ically improving. Feeling better. Neurology was consulted. EEG was obtained which showed mildly abnormal .  Continue Keppra 500 mg twice daily, follow-up outpatient for further monitoring of spells. Physical therapy and Occupational Therapy were consulted during admission.   Subacute rehab was considered initially but patient improved with respect to his functional status and was deemed stable for discharge to home. Started patient on donepezil for Alzheimer's dementia, amlodipine was initiated for hypertension    Consults:   IP CONSULT TO PALLIATIVE CARE    Discharge Diagnoses:  Seizure activity/acute encephalopathy-mildly abnormal EEG  Possible Alzheimer's dementia at baseline  Elevated LFTs-improving  Hypertension  Hyperlipidemia      Discharge Instructions / Follow up: Follow-up with PCP within 1 week of discharge. Follow-up with consultants as indicated by them. Compliance with medications as prescribed on discharge. No future appointments. The patient's condition is Stable . At this time the patient is without objective evidence of an acute process requiring continuing hospitalization or inpatient management. They are stable for discharge with outpatient follow-up. I have spoken with the patient and discussed the results of the current hospitalization, in addition to providing specific details for the plan of care and counseling regarding the diagnosis and prognosis. The plan has been discussed in detail and they are aware of the specific conditions for emergent return, as well as the importance of follow-up. Their questions are answered at this time and they are agreeable with the plan for discharge to home. Continued appropriate risk factor modification of blood pressure, diabetes and serum lipids will remain essential to reducing risk of future atherosclerotic development    Activity: activity as tolerated    Physical exam:  General appearance: No apparent distress, appears stated age and cooperative. HEENT: Conjunctivae/corneas clear. Mucous membranes moist.  Neck: Supple. No JVD. Respiratory:  Clear to auscultation bilaterally. Normal respiratory effort. Cardiovascular:  RRR. S1, S2 without MRG. PV: Pulses palpable. No edema. Abdomen: Soft, non-tender, non-distended.

## 2022-08-02 NOTE — PROGRESS NOTES
CLINICAL PHARMACY NOTE: MEDS TO BEDS    Total # of Prescriptions Filled: 4   The following medications were delivered to the patient:  Amlodipine 5mg  Levetiracetam  500mg  Quetiapine 50mg  Quetiapine 25mg    Additional Documentation:  Delivered to pt 8/2